# Patient Record
Sex: FEMALE | Race: WHITE | Employment: FULL TIME | URBAN - METROPOLITAN AREA
[De-identification: names, ages, dates, MRNs, and addresses within clinical notes are randomized per-mention and may not be internally consistent; named-entity substitution may affect disease eponyms.]

---

## 2017-08-14 PROBLEM — E66.9 OBESITY: Status: ACTIVE | Noted: 2017-08-14

## 2017-08-14 PROBLEM — K21.9 GASTROESOPHAGEAL REFLUX DISEASE: Status: ACTIVE | Noted: 2017-08-14

## 2017-11-04 ENCOUNTER — HOSPITAL ENCOUNTER (OUTPATIENT)
Dept: GENERAL RADIOLOGY | Age: 27
Discharge: HOME | End: 2017-11-04

## 2017-11-04 ENCOUNTER — HOSPITAL ENCOUNTER (OUTPATIENT)
Age: 27
Discharge: HOME | End: 2017-11-04

## 2017-11-04 DIAGNOSIS — Z00.00 PHYSICAL EXAM: ICD-10-CM

## 2017-11-04 LAB
ABSOLUTE EOS #: 0.09 K/UL (ref 0–0.44)
ABSOLUTE IMMATURE GRANULOCYTE: 0.03 K/UL (ref 0–0.3)
ABSOLUTE LYMPH #: 1.94 K/UL (ref 1.1–3.7)
ABSOLUTE MONO #: 0.68 K/UL (ref 0.1–1.2)
ALBUMIN SERPL-MCNC: 4.3 G/DL (ref 3.5–5.2)
ALBUMIN/GLOBULIN RATIO: 1.3 (ref 1–2.5)
ALP BLD-CCNC: 81 U/L (ref 35–104)
ALT SERPL-CCNC: 15 U/L (ref 5–33)
ANION GAP SERPL CALCULATED.3IONS-SCNC: 14 MMOL/L (ref 9–17)
AST SERPL-CCNC: 17 U/L
BASOPHILS # BLD: 0 % (ref 0–2)
BASOPHILS ABSOLUTE: 0.03 K/UL (ref 0–0.2)
BILIRUB SERPL-MCNC: 0.44 MG/DL (ref 0.3–1.2)
BUN BLDV-MCNC: 16 MG/DL (ref 6–20)
BUN/CREAT BLD: NORMAL (ref 9–20)
CALCIUM SERPL-MCNC: 9.1 MG/DL (ref 8.6–10.4)
CHLORIDE BLD-SCNC: 102 MMOL/L (ref 98–107)
CHOLESTEROL/HDL RATIO: 2.9
CHOLESTEROL: 169 MG/DL
CO2: 20 MMOL/L (ref 20–31)
CREAT SERPL-MCNC: 0.5 MG/DL (ref 0.5–0.9)
DIFFERENTIAL TYPE: NORMAL
EOSINOPHILS RELATIVE PERCENT: 1 % (ref 1–4)
GFR AFRICAN AMERICAN: >60 ML/MIN
GFR NON-AFRICAN AMERICAN: >60 ML/MIN
GFR SERPL CREATININE-BSD FRML MDRD: NORMAL ML/MIN/{1.73_M2}
GFR SERPL CREATININE-BSD FRML MDRD: NORMAL ML/MIN/{1.73_M2}
GLOBULIN: NORMAL G/DL (ref 1.5–3.8)
GLUCOSE BLD-MCNC: 98 MG/DL (ref 70–99)
HBV SURFACE AB TITR SER: 806.2 MIU/ML
HCG QUALITATIVE: NEGATIVE
HCT VFR BLD CALC: 44.8 % (ref 36.3–47.1)
HDLC SERPL-MCNC: 58 MG/DL
HEMOGLOBIN: 14.7 G/DL (ref 11.9–15.1)
IMMATURE GRANULOCYTES: 0 %
IRON: 81 UG/DL (ref 37–145)
LACTATE DEHYDROGENASE: 167 U/L (ref 135–214)
LDL CHOLESTEROL: 102 MG/DL (ref 0–130)
LYMPHOCYTES # BLD: 25 % (ref 24–43)
MCH RBC QN AUTO: 30.4 PG (ref 25.2–33.5)
MCHC RBC AUTO-ENTMCNC: 32.8 G/DL (ref 28.4–34.8)
MCV RBC AUTO: 92.6 FL (ref 82.6–102.9)
MONOCYTES # BLD: 9 % (ref 3–12)
PDW BLD-RTO: 12.8 % (ref 11.8–14.4)
PHOSPHORUS: 3 MG/DL (ref 2.6–4.5)
PLATELET # BLD: 316 K/UL (ref 138–453)
PLATELET ESTIMATE: NORMAL
PMV BLD AUTO: 9.4 FL (ref 8.1–13.5)
POTASSIUM SERPL-SCNC: 4.5 MMOL/L (ref 3.7–5.3)
RBC # BLD: 4.84 M/UL (ref 3.95–5.11)
RBC # BLD: NORMAL 10*6/UL
SEG NEUTROPHILS: 65 % (ref 36–65)
SEGMENTED NEUTROPHILS ABSOLUTE COUNT: 5.1 K/UL (ref 1.5–8.1)
SODIUM BLD-SCNC: 136 MMOL/L (ref 135–144)
TOTAL PROTEIN: 7.6 G/DL (ref 6.4–8.3)
TRIGL SERPL-MCNC: 43 MG/DL
URIC ACID: 4.5 MG/DL (ref 2.4–5.7)
VLDLC SERPL CALC-MCNC: NORMAL MG/DL (ref 1–30)
WBC # BLD: 7.9 K/UL (ref 3.5–11.3)
WBC # BLD: NORMAL 10*3/UL

## 2017-11-04 PROCEDURE — 83540 ASSAY OF IRON: CPT

## 2017-11-04 PROCEDURE — 85025 COMPLETE CBC W/AUTO DIFF WBC: CPT

## 2017-11-04 PROCEDURE — 84703 CHORIONIC GONADOTROPIN ASSAY: CPT

## 2017-11-04 PROCEDURE — 80053 COMPREHEN METABOLIC PANEL: CPT

## 2017-11-04 PROCEDURE — 84100 ASSAY OF PHOSPHORUS: CPT

## 2017-11-04 PROCEDURE — 36415 COLL VENOUS BLD VENIPUNCTURE: CPT

## 2017-11-04 PROCEDURE — 86317 IMMUNOASSAY INFECTIOUS AGENT: CPT

## 2017-11-04 PROCEDURE — 84550 ASSAY OF BLOOD/URIC ACID: CPT

## 2017-11-04 PROCEDURE — 80061 LIPID PANEL: CPT

## 2017-11-04 PROCEDURE — 86480 TB TEST CELL IMMUN MEASURE: CPT

## 2017-11-04 PROCEDURE — 83615 LACTATE (LD) (LDH) ENZYME: CPT

## 2017-11-04 PROCEDURE — 71010 XR CHEST LIMITED: CPT

## 2017-11-06 LAB
QUANTIFERON (R) TB GOLD (INCUBATED): NEGATIVE
QUANTIFERON MITOGEN: >10 IU/ML
QUANTIFERON NIL: 0.03 IU/ML
QUANTIFERON TB AG MINUS NIL: 0 IU/ML (ref 0–0.34)

## 2018-07-20 ENCOUNTER — HOSPITAL ENCOUNTER (EMERGENCY)
Facility: CLINIC | Age: 28
Discharge: HOME | End: 2018-07-20
Attending: EMERGENCY MEDICINE
Payer: COMMERCIAL

## 2018-07-20 VITALS
DIASTOLIC BLOOD PRESSURE: 84 MMHG | BODY MASS INDEX: 30.61 KG/M2 | SYSTOLIC BLOOD PRESSURE: 132 MMHG | OXYGEN SATURATION: 100 % | WEIGHT: 195 LBS | HEART RATE: 58 BPM | HEIGHT: 67 IN | TEMPERATURE: 97.5 F | RESPIRATION RATE: 12 BRPM

## 2018-07-20 DIAGNOSIS — B35.4 TINEA CORPORIS: Primary | ICD-10-CM

## 2018-07-20 PROCEDURE — 99282 EMERGENCY DEPT VISIT SF MDM: CPT

## 2018-07-20 RX ORDER — NYSTATIN 100000 U/G
CREAM TOPICAL
Qty: 15 G | Refills: 0 | Status: SHIPPED | OUTPATIENT
Start: 2018-07-20 | End: 2018-10-08

## 2018-07-20 ASSESSMENT — PAIN SCALES - GENERAL
PAINLEVEL_OUTOF10: 7
PAINLEVEL_OUTOF10: 7

## 2018-07-20 ASSESSMENT — PAIN DESCRIPTION - DESCRIPTORS
DESCRIPTORS: BURNING;ITCHING
DESCRIPTORS: BURNING;ITCHING

## 2018-07-20 ASSESSMENT — PAIN DESCRIPTION - LOCATION
LOCATION: UMBILICUS
LOCATION: UMBILICUS

## 2018-07-20 ASSESSMENT — PAIN DESCRIPTION - PAIN TYPE
TYPE: ACUTE PAIN
TYPE: ACUTE PAIN

## 2018-07-20 ASSESSMENT — PAIN DESCRIPTION - FREQUENCY: FREQUENCY: CONTINUOUS

## 2018-07-20 NOTE — ED NOTES
Patient arrives to the ED for complaints of a rash. States it started several days ago, redness tot he R side of her umbilicus. It has since spread around and in her entire umbilical area. It is burning and itching. She has tried Hydrocortisone cream and Vagisil. Also has been using an ice pack for comfort. Patient given an ice pack, states she has been taking Tylenol/ Motrin as well. Resting quietly, call light in reach.       Tomeka Penn RN  07/20/18 [id number]

## 2018-07-20 NOTE — ED PROVIDER NOTES
TOPIRAMATE (TOPAMAX) 50 MG TABLET    TAKE ONE TABLET BY MOUTH TWICE A DAY       ALLERGIES     is allergic to amoxicillin; other; pcn [penicillins]; and sulfa antibiotics. FAMILY HISTORY     indicated that the status of her father is unknown. She indicated that the status of her maternal grandmother is unknown. She indicated that the status of her maternal grandfather is unknown. She indicated that the status of her paternal grandmother is unknown. She indicated that the status of her paternal grandfather is unknown.      family history includes Cancer in her paternal grandfather and paternal grandmother; Diabetes in her maternal grandfather; Heart Disease in her father, maternal grandfather, maternal grandmother, paternal grandfather, and paternal grandmother. SOCIAL HISTORY      reports that she has never smoked. She has never used smokeless tobacco. She reports that she does not drink alcohol or use drugs. PHYSICAL EXAM     INITIAL VITALS:  height is 5' 7\" (1.702 m) and weight is 88.5 kg (195 lb). Her oral temperature is 97.5 °F (36.4 °C). Her blood pressure is 132/84 and her pulse is 58. Her respiration is 12 and oxygen saturation is 100%. Patient is alert and oriented, in no apparent distress. HEENT is atraumatic. Pupils are PERRL at 4 mm. Mucous membranes moist.    Neck is supple with no lymphadenopathy. No JVD. No meningismus. Heart sounds regular rate and rhythm with no gallops, murmurs, or rubs. Lungs clear, no wheezes, rales or rhonchi. Abdomen: soft, nontender with no pain to palpation. Normal bowel sounds are noted. No rebound or guarding. Redness noted in periumbilical area and within the umbilicus consistent with a candidal infection. No true cellulitic process or abscess. Musculoskeletal exam shows no evidence of trauma. Skin: no rash or edema. Neurological exam reveals cranial nerves 2 through 12 grossly intact.   Patient has equal  and normal deep tendon reflexes. Psychiatric: no hallucinations or suicidal ideation. Lymphatics.:  No lymphadenopathy. DIFFERENTIAL DIAGNOSIS/ MDM:     Candidiasis, cellulitis, abscess    DIAGNOSTIC RESULTS       EMERGENCY DEPARTMENT COURSE:   Vitals:    Vitals:    07/20/18 1828   BP: 132/84   Pulse: 58   Resp: 12   Temp: 97.5 °F (36.4 °C)   TempSrc: Oral   SpO2: 100%   Weight: 88.5 kg (195 lb)   Height: 5' 7\" (1.702 m)     -------------------------  BP: 132/84, Temp: 97.5 °F (36.4 °C), Pulse: 58, Resp: 12      Re-evaluation Notes    I will prescribe antifungal medication. The patient declines work excuse. She is discharged in good condition. FINAL IMPRESSION      1. Tinea corporis          DISPOSITION/PLAN   DISPOSITION Decision To Discharge 07/20/2018 06:47:05 PM      Condition on Disposition    good    PATIENT REFERRED TO:  Hamilton Muller MD  [address] [facility] [id number] [phone]    In 1 week        DISCHARGE MEDICATIONS:  New Prescriptions    NYSTATIN (MYCOSTATIN) 617966 UNIT/GM CREAM    Apply topically 2 times daily.        (Please note that portions of this note were completed with a voice recognition program.  Efforts were made to edit the dictations but occasionally words are mis-transcribed.)    Angel MD   Attending Emergency Physician       Carolynn Ascencio MD  07/20/18 2188

## 2018-10-30 ENCOUNTER — HOSPITAL ENCOUNTER (OUTPATIENT)
Dept: MRI IMAGING | Age: 28
Discharge: HOME | End: 2018-11-01
Payer: COMMERCIAL

## 2018-10-30 ENCOUNTER — HOSPITAL ENCOUNTER (OUTPATIENT)
Age: 28
Discharge: HOME | End: 2018-11-01
Payer: COMMERCIAL

## 2018-10-30 ENCOUNTER — HOSPITAL ENCOUNTER (OUTPATIENT)
Dept: GENERAL RADIOLOGY | Age: 28
Discharge: HOME | End: 2018-11-01
Payer: COMMERCIAL

## 2018-10-30 DIAGNOSIS — R52 PAIN: ICD-10-CM

## 2018-10-30 DIAGNOSIS — G89.29 CHRONIC NONINTRACTABLE HEADACHE, UNSPECIFIED HEADACHE TYPE: ICD-10-CM

## 2018-10-30 DIAGNOSIS — R51.9 CHRONIC NONINTRACTABLE HEADACHE, UNSPECIFIED HEADACHE TYPE: ICD-10-CM

## 2018-10-30 PROCEDURE — 70551 MRI BRAIN STEM W/O DYE: CPT

## 2018-10-30 PROCEDURE — 72040 X-RAY EXAM NECK SPINE 2-3 VW: CPT

## 2018-12-31 RX ORDER — TOPIRAMATE 50 MG/1
100 TABLET, FILM COATED ORAL 2 TIMES DAILY
Qty: 120 TABLET | Refills: 2 | Status: SHIPPED | OUTPATIENT
Start: 2018-12-31 | End: 2019-05-22

## 2019-03-22 ENCOUNTER — OFFICE VISIT (OUTPATIENT)
Dept: PRIMARY CARE CLINIC | Age: 29
End: 2019-03-22
Payer: COMMERCIAL

## 2019-03-22 ENCOUNTER — HOSPITAL ENCOUNTER (OUTPATIENT)
Age: 29
Setting detail: SPECIMEN
Discharge: HOME | End: 2019-03-22
Payer: COMMERCIAL

## 2019-03-22 VITALS
BODY MASS INDEX: 36.9 KG/M2 | OXYGEN SATURATION: 98 % | WEIGHT: 235.6 LBS | SYSTOLIC BLOOD PRESSURE: 134 MMHG | DIASTOLIC BLOOD PRESSURE: 84 MMHG | TEMPERATURE: 98.2 F | HEART RATE: 83 BPM

## 2019-03-22 DIAGNOSIS — Z11.4 ENCOUNTER FOR SCREENING FOR HIV: ICD-10-CM

## 2019-03-22 DIAGNOSIS — R73.01 IMPAIRED FASTING GLUCOSE: Primary | ICD-10-CM

## 2019-03-22 DIAGNOSIS — J06.9 VIRAL UPPER RESPIRATORY TRACT INFECTION: ICD-10-CM

## 2019-03-22 DIAGNOSIS — E66.9 OBESITY (BMI 35.0-39.9 WITHOUT COMORBIDITY): ICD-10-CM

## 2019-03-22 DIAGNOSIS — R73.01 IMPAIRED FASTING GLUCOSE: ICD-10-CM

## 2019-03-22 LAB — HIV AG/AB: NONREACTIVE

## 2019-03-22 PROCEDURE — 99214 OFFICE O/P EST MOD 30 MIN: CPT | Performed by: PHYSICIAN ASSISTANT

## 2019-03-22 ASSESSMENT — PATIENT HEALTH QUESTIONNAIRE - PHQ9
SUM OF ALL RESPONSES TO PHQ QUESTIONS 1-9: 0
SUM OF ALL RESPONSES TO PHQ9 QUESTIONS 1 & 2: 0
2. FEELING DOWN, DEPRESSED OR HOPELESS: 0
1. LITTLE INTEREST OR PLEASURE IN DOING THINGS: 0
SUM OF ALL RESPONSES TO PHQ QUESTIONS 1-9: 0

## 2019-03-22 ASSESSMENT — ENCOUNTER SYMPTOMS
SINUS PRESSURE: 1
SINUS PAIN: 1
SORE THROAT: 0
COUGH: 0

## 2019-03-24 LAB
ESTIMATED AVERAGE GLUCOSE: 100 MG/DL
HBA1C MFR BLD: 5.1 % (ref 4–6)

## 2019-05-02 DIAGNOSIS — E66.9 OBESITY (BMI 35.0-39.9 WITHOUT COMORBIDITY): ICD-10-CM

## 2019-05-02 NOTE — TELEPHONE ENCOUNTER
Last visit: 3/22/19  Last Med refill: 3/22/19  Does patient have enough medication for 72 hours: Yes      Next Visit Date:  Future Appointments   Date Time Provider Dov Thomason   5/22/2019  9:15 AM Billy Diaz PA-C [facility] [address] Maintenance   Topic Date Due    Varicella Vaccine (1 of 2 - 13+ 2-dose series) 10/23/2009    Cervical cancer screen  03/28/2019    A1C test (Diabetic or Prediabetic)  03/22/2020    DTaP/Tdap/Td vaccine (6 - Td) 01/13/2021    Flu vaccine  Completed    HIV screen  Completed    Pneumococcal 0-64 years Vaccine  Aged Out       Hemoglobin A1C (%)   Date Value   03/22/2019 5.1             ( goal A1C is < 7)   No results found for: LABMICR  LDL Cholesterol (mg/dL)   Date Value   11/04/2017 102       (goal LDL is <100)   AST (U/L)   Date Value   11/04/2017 17     ALT (U/L)   Date Value   11/04/2017 15     BUN (mg/dL)   Date Value   11/04/2017 16     BP Readings from Last 3 Encounters:   03/22/19 134/84   10/08/18 122/74   09/06/18 124/88          (goal 120/80)    All Future Testing planned in [facility]  Lab Frequency Next Occurrence               Patient Active Problem List:     Attention deficit disorder without hyperactivity     Cervicalgia     Gastroenteritis due to Clostridium difficile     Depression with anxiety     Encounter for pre-employment health screening examination     Examination for school or camp     Exercise-induced bronchospasm     Factor V deficiency (HCC)     Headache     Heartburn     Impaired fasting glucose     Irritable bowel syndrome     Methicillin resistant Staphylococcus aureus infection     Xerosis cutis     Gastroesophageal reflux disease

## 2019-05-22 ENCOUNTER — OFFICE VISIT (OUTPATIENT)
Dept: PRIMARY CARE CLINIC | Age: 29
End: 2019-05-22
Payer: COMMERCIAL

## 2019-05-22 VITALS
DIASTOLIC BLOOD PRESSURE: 86 MMHG | OXYGEN SATURATION: 98 % | SYSTOLIC BLOOD PRESSURE: 122 MMHG | HEART RATE: 89 BPM | WEIGHT: 227.4 LBS | BODY MASS INDEX: 35.62 KG/M2

## 2019-05-22 DIAGNOSIS — L91.8 SKIN TAG: ICD-10-CM

## 2019-05-22 DIAGNOSIS — E66.9 OBESITY (BMI 35.0-39.9 WITHOUT COMORBIDITY): ICD-10-CM

## 2019-05-22 PROCEDURE — 99213 OFFICE O/P EST LOW 20 MIN: CPT | Performed by: PHYSICIAN ASSISTANT

## 2019-05-22 ASSESSMENT — ENCOUNTER SYMPTOMS: BACK PAIN: 0

## 2019-05-22 NOTE — PROGRESS NOTES
[address] [facility] PRIMARY CARE  [id number] [facility]  Dept: Milana is a 34 y.o. female who presents today for her medical conditions/complaintsas noted below. Chief Complaint   Patient presents with    Weight Management     Taking Contrave. Lost 8 lbs since last visit. Going well, no SE.        HPI:     HPI  Exercise  4-5 times a week.     Contrave: no side effects  Personal goal: 180  Diet: cutting out carbs, portion control    Has small skin tag on right nipple--no pain and does not bother her at this time      LDL Cholesterol (mg/dL)   Date Value   11/04/2017 102       (goal LDL is <100)   AST (U/L)   Date Value   11/04/2017 17     ALT (U/L)   Date Value   11/04/2017 15     BUN (mg/dL)   Date Value   11/04/2017 16     BP Readings from Last 3 Encounters:   05/22/19 122/86   03/22/19 134/84   10/08/18 122/74          (goal 120/80)    Past Medical History:   Diagnosis Date    Factor V Leiden mutation ([facility] [address]     H/O concussion     H/O contact dermatitis     H/O irritable bowel syndrome     Pain of anterior chest wall with respiration       Past Surgical History:   Procedure Laterality Date    CHOLECYSTECTOMY      PARTIAL HYMENECTOMY  2008       Family History   Problem Relation Age of Onset    Heart Disease Father     Heart Disease Maternal Grandmother     Diabetes Maternal Grandfather     Heart Disease Maternal Grandfather     Cancer Paternal Grandmother         breast    Heart Disease Paternal Grandmother     Heart Disease Paternal Grandfather     Cancer Paternal Grandfather         prostate       Social History     Tobacco Use    Smoking status: Never Smoker    Smokeless tobacco: Never Used   Substance Use Topics    Alcohol use: No     Alcohol/week: 0.0 oz      Current Outpatient Medications   Medication Sig Dispense Refill    topiramate ER (TROKENDI XR) 100 MG CP24 Take 1 tablet by mouth nightly      naltrexone-bupropion (CONTRAVE) 8-90 MG per extended release tablet Take 2 tablets by mouth 2 times daily 120 tablet 2    pantoprazole (PROTONIX) 40 MG tablet Take 1 tablet by mouth daily (Patient taking differently: Take 40 mg by mouth as needed ) 30 tablet 3    albuterol sulfate HFA (VENTOLIN HFA) 108 (90 Base) MCG/ACT inhaler Inhale 2 puffs into the lungs every 6 hours as needed for Wheezing 1 Inhaler 3     No current facility-administered medications for this visit. Allergies   Allergen Reactions    Amoxicillin     Other      Food dye     Blue and green    Pcn [Penicillins]     Sulfa Antibiotics        Health Maintenance   Topic Date Due    Varicella Vaccine (1 of 2 - 13+ 2-dose series) 10/23/2009    Cervical cancer screen  03/28/2019    A1C test (Diabetic or Prediabetic)  03/22/2020    DTaP/Tdap/Td vaccine (6 - Td) 01/13/2021    Flu vaccine  Completed    HIV screen  Completed    Pneumococcal 0-64 years Vaccine  Aged Out       Subjective:      Review of Systems   Constitutional: Negative for activity change, appetite change and unexpected weight change. Endocrine: Negative for cold intolerance, heat intolerance, polydipsia, polyphagia and polyuria. Musculoskeletal: Negative for arthralgias, back pain and myalgias. Psychiatric/Behavioral: Negative for behavioral problems, dysphoric mood and sleep disturbance. The patient is not nervous/anxious. Objective:     /86   Pulse 89   Wt 227 lb 6.4 oz (103.1 kg)   SpO2 98%   BMI 35.62 kg/m²   Physical Exam   Constitutional: She is oriented to person, place, and time. Cardiovascular: Normal rate, regular rhythm and normal heart sounds. Pulmonary/Chest: Effort normal and breath sounds normal.   Neurological: She is alert and oriented to person, place, and time. Skin:   Small skin tag on right nipple---looks red but not painful. Psychiatric: She has a normal mood and affect. Vitals reviewed.       Assessment:

## 2019-07-12 ENCOUNTER — HOSPITAL ENCOUNTER (OUTPATIENT)
Age: 29
Setting detail: SPECIMEN
Discharge: HOME | End: 2019-07-12
Payer: COMMERCIAL

## 2019-07-12 ENCOUNTER — OFFICE VISIT (OUTPATIENT)
Dept: PRIMARY CARE CLINIC | Age: 29
End: 2019-07-12
Payer: COMMERCIAL

## 2019-07-12 VITALS
DIASTOLIC BLOOD PRESSURE: 64 MMHG | WEIGHT: 230.8 LBS | BODY MASS INDEX: 36.15 KG/M2 | HEART RATE: 77 BPM | OXYGEN SATURATION: 98 % | TEMPERATURE: 98.7 F | SYSTOLIC BLOOD PRESSURE: 112 MMHG

## 2019-07-12 DIAGNOSIS — J02.9 ACUTE VIRAL PHARYNGITIS: ICD-10-CM

## 2019-07-12 DIAGNOSIS — R39.9 URINARY TRACT INFECTION SYMPTOMS: Primary | ICD-10-CM

## 2019-07-12 DIAGNOSIS — R39.9 URINARY TRACT INFECTION SYMPTOMS: ICD-10-CM

## 2019-07-12 LAB
BILIRUBIN, POC: ABNORMAL
BLOOD URINE, POC: ABNORMAL
CLARITY, POC: CLEAR
COLOR, POC: YELLOW
GLUCOSE URINE, POC: ABNORMAL
KETONES, POC: ABNORMAL
LEUKOCYTE EST, POC: ABNORMAL
NITRITE, POC: ABNORMAL
PH, POC: 7.5
PROTEIN, POC: ABNORMAL
SPECIFIC GRAVITY, POC: 1.01
UROBILINOGEN, POC: 0.2

## 2019-07-12 PROCEDURE — 81003 URINALYSIS AUTO W/O SCOPE: CPT | Performed by: PHYSICIAN ASSISTANT

## 2019-07-12 PROCEDURE — 99213 OFFICE O/P EST LOW 20 MIN: CPT | Performed by: PHYSICIAN ASSISTANT

## 2019-07-12 ASSESSMENT — ENCOUNTER SYMPTOMS
ABDOMINAL DISTENTION: 0
WHEEZING: 0
SORE THROAT: 1
COUGH: 1
CONSTIPATION: 0
ABDOMINAL PAIN: 0
EYES NEGATIVE: 1
DIARRHEA: 0
VOMITING: 0
BACK PAIN: 0
NAUSEA: 0
RHINORRHEA: 0
SHORTNESS OF BREATH: 0

## 2019-07-12 NOTE — PROGRESS NOTES
Negative for arthralgias, back pain and myalgias. Skin: Negative for rash. Neurological: Negative for dizziness, light-headedness and headaches. Objective:     /64   Pulse 77   Temp 98.7 °F (37.1 °C)   Wt 230 lb 12.8 oz (104.7 kg)   LMP 06/28/2019 (Within Days)   SpO2 98%   BMI 36.15 kg/m²   Physical Exam   HENT:   Right Ear: Tympanic membrane, external ear and ear canal normal. No middle ear effusion. Left Ear: Tympanic membrane, external ear and ear canal normal.  No middle ear effusion. Nose: Nose normal. Right sinus exhibits no maxillary sinus tenderness and no frontal sinus tenderness. Left sinus exhibits no maxillary sinus tenderness and no frontal sinus tenderness. Mouth/Throat: Uvula is midline, oropharynx is clear and moist and mucous membranes are normal. Tonsils are 0 on the right. Tonsils are 0 on the left. Eyes: Pupils are equal, round, and reactive to light. Conjunctivae are normal. Right eye exhibits no discharge. Left eye exhibits no discharge. Cardiovascular: Normal rate, regular rhythm and normal heart sounds. Pulmonary/Chest: Effort normal and breath sounds normal.   Abdominal: Normal appearance and bowel sounds are normal. There is no hepatosplenomegaly. There is no tenderness. There is no rigidity, no rebound, no guarding, no CVA tenderness, no tenderness at McBurney's point and negative Devi's sign. Vitals reviewed. Assessment:       Diagnosis Orders   1. Urinary tract infection symptoms  Urine Culture    POCT Urinalysis No Micro (Auto)   2. Acute viral pharyngitis          Plan:    Supportive care  Did send urine for culture. Increase fluids   No follow-ups on file. Orders Placed This Encounter   Procedures    Urine Culture     Standing Status:   Future     Standing Expiration Date:   7/12/2020     Order Specific Question:   Specify (ex-cath, midstream, cysto, etc)?      Answer:   midstream    POCT Urinalysis No Micro (Auto)     No orders of the

## 2019-07-13 LAB
CULTURE: NORMAL
Lab: NORMAL
SPECIMEN DESCRIPTION: NORMAL

## 2019-08-20 ENCOUNTER — TELEPHONE (OUTPATIENT)
Dept: PRIMARY CARE CLINIC | Age: 29
End: 2019-08-20

## 2019-08-20 ENCOUNTER — OFFICE VISIT (OUTPATIENT)
Dept: PRIMARY CARE CLINIC | Age: 29
End: 2019-08-20
Payer: COMMERCIAL

## 2019-08-20 VITALS
HEIGHT: 67 IN | OXYGEN SATURATION: 98 % | WEIGHT: 234.8 LBS | HEART RATE: 74 BPM | BODY MASS INDEX: 36.85 KG/M2 | SYSTOLIC BLOOD PRESSURE: 108 MMHG | DIASTOLIC BLOOD PRESSURE: 70 MMHG

## 2019-08-20 DIAGNOSIS — G47.19 EXCESSIVE DAYTIME SLEEPINESS: ICD-10-CM

## 2019-08-20 DIAGNOSIS — E66.9 OBESITY (BMI 35.0-39.9 WITHOUT COMORBIDITY): ICD-10-CM

## 2019-08-20 DIAGNOSIS — E66.9 OBESITY (BMI 35.0-39.9 WITHOUT COMORBIDITY): Primary | ICD-10-CM

## 2019-08-20 DIAGNOSIS — G47.09 OTHER INSOMNIA: ICD-10-CM

## 2019-08-20 PROCEDURE — 99213 OFFICE O/P EST LOW 20 MIN: CPT | Performed by: PHYSICIAN ASSISTANT

## 2019-08-20 RX ORDER — ESZOPICLONE 3 MG/1
3 TABLET, FILM COATED ORAL NIGHTLY PRN
Qty: 30 TABLET | Refills: 0 | Status: SHIPPED | OUTPATIENT
Start: 2019-08-20 | End: 2019-09-19

## 2019-08-20 ASSESSMENT — ENCOUNTER SYMPTOMS
RESPIRATORY NEGATIVE: 1
BACK PAIN: 0

## 2019-08-20 NOTE — PROGRESS NOTES
dysphoric mood. The patient is not nervous/anxious. Objective:     /70   Pulse 74   Ht 5' 6.5\" (1.689 m)   Wt 234 lb 12.8 oz (106.5 kg)   SpO2 98%   BMI 37.33 kg/m²   Physical Exam   Cardiovascular: Normal rate, regular rhythm and normal heart sounds. Pulmonary/Chest: Effort normal and breath sounds normal.   Vitals reviewed. Assessment:       Diagnosis Orders   1. Obesity (BMI 35.0-39.9 without comorbidity)  Lorcaserin HCl (BELVIQ) 10 MG TABS   2. Other insomnia  eszopiclone (ESZOPICLONE) 3 MG TABS    Baseline Diagnostic Sleep Study   3. Excessive daytime sleepiness  Baseline Diagnostic Sleep Study        Plan:    [place] scale today ---sleep study ordered  Trial Lunesta for a 5 day reset when off and then PRN  Belviq script given  Count calories   Exercise daily    Return in about 2 months (around 10/20/2019). Orders Placed This Encounter   Procedures    Baseline Diagnostic Sleep Study     Standing Status:   Future     Standing Expiration Date:   2/20/2021     Order Specific Question:   Adult or Pediatric     Answer:   Adult Study (>7 Years)     Order Specific Question:   Location For Sleep Study     Answer: [facility] Specific Question:   Select Sleep Lab Location     Answer:   [address]     Order Specific Question:   Pre-Study Patient Questions: Answer:   Complains of daytime sleepiness     Orders Placed This Encounter   Medications    Lorcaserin HCl (BELVIQ) 10 MG TABS     Sig: Take 10 mg by mouth 2 times daily for 30 days. 15 day free trial     Dispense:  30 tablet     Refill:  1    eszopiclone (ESZOPICLONE) 3 MG TABS     Sig: Take 1 tablet by mouth nightly as needed (insomnia) for up to 30 days. Dispense:  30 tablet     Refill:  0       Patient given educationalmaterials - see patient instructions. Discussed use, benefit, and side effectsof prescribed medications. All patient questions answered. Pt voiced understanding. Reviewed health maintenance. Instructed to continue current medications, diet andexercise. Patient agreed with treatment plan. Follow up as directed.      Electronicallysigned by Carole Harp PA-C on 8/20/2019 at 9:56 AM

## 2020-01-14 ENCOUNTER — OFFICE VISIT (OUTPATIENT)
Dept: PRIMARY CARE CLINIC | Age: 30
End: 2020-01-14
Payer: COMMERCIAL

## 2020-01-14 VITALS
SYSTOLIC BLOOD PRESSURE: 120 MMHG | HEIGHT: 66 IN | WEIGHT: 247 LBS | TEMPERATURE: 97.9 F | HEART RATE: 79 BPM | BODY MASS INDEX: 39.7 KG/M2 | OXYGEN SATURATION: 97 % | DIASTOLIC BLOOD PRESSURE: 82 MMHG

## 2020-01-14 PROBLEM — R05.9 COUGH: Status: ACTIVE | Noted: 2020-01-14

## 2020-01-14 PROCEDURE — 99213 OFFICE O/P EST LOW 20 MIN: CPT | Performed by: PHYSICIAN ASSISTANT

## 2020-01-14 RX ORDER — BENZONATATE 100 MG/1
100-200 CAPSULE ORAL 3 TIMES DAILY PRN
Qty: 60 CAPSULE | Refills: 0 | Status: SHIPPED | OUTPATIENT
Start: 2020-01-14 | End: 2020-01-21

## 2020-01-14 RX ORDER — BENZONATATE 100 MG/1
100-200 CAPSULE ORAL 3 TIMES DAILY PRN
Qty: 60 CAPSULE | Refills: 0 | Status: SHIPPED | OUTPATIENT
Start: 2020-01-14 | End: 2020-01-14 | Stop reason: SDUPTHER

## 2020-01-14 ASSESSMENT — ENCOUNTER SYMPTOMS
SORE THROAT: 1
SHORTNESS OF BREATH: 0
CHOKING: 0
WHEEZING: 0
VOMITING: 0
CHEST TIGHTNESS: 0
SINUS PAIN: 1
TROUBLE SWALLOWING: 1
DIARRHEA: 0
NAUSEA: 0
COUGH: 1

## 2020-01-14 NOTE — PROGRESS NOTES
[facility][1] Primary Care  [address]  Phone: [phone]  Fax: [phone]    Ninoska Sosa is a 34 y.o. female who presents today for her medical conditions/complaintsas noted below. Chief Complaint   Patient presents with    Pharyngitis     patient c/o sore throat since 1/5/20. Patient said it hurts to swollow    Cough     patinet c/o of dry cough since 1/5/20. HPI:     HPI  Started 1/5/20 with ST and now feels like something is stuck in throat. Hurts more at home  Ibuprofen and Mucinex. Current Outpatient Medications   Medication Sig Dispense Refill    benzonatate (TESSALON) 100 MG capsule Take 1-2 capsules by mouth 3 times daily as needed for Cough 60 capsule 0    topiramate ER (TROKENDI XR) 100 MG CP24 Take 1 tablet by mouth nightly      pantoprazole (PROTONIX) 40 MG tablet Take 1 tablet by mouth daily (Patient taking differently: Take 40 mg by mouth as needed ) 30 tablet 3     No current facility-administered medications for this visit. Allergies   Allergen Reactions    Amoxicillin     Other      Food dye     Blue and green    Pcn [Penicillins]     Sulfa Antibiotics        Subjective:      Review of Systems   Constitutional: Positive for fatigue. Negative for chills, diaphoresis and fever. HENT: Positive for congestion, sinus pain (left above eye), sore throat and trouble swallowing. Respiratory: Positive for cough. Negative for choking, chest tightness, shortness of breath and wheezing. Gastrointestinal: Negative for diarrhea, nausea and vomiting. Neurological: Negative for dizziness, light-headedness and headaches. Objective:     /82   Pulse 79   Temp 97.9 °F (36.6 °C)   Ht 5' 6\" (1.676 m)   Wt 247 lb (112 kg)   SpO2 97%   BMI 39.87 kg/m²   Physical Exam  Vitals signs and nursing note reviewed. Constitutional:       Appearance: Normal appearance.    HENT:      Right Ear: Tympanic membrane, ear canal and external ear normal.      Left Ear: Tympanic membrane, ear canal and external ear normal.      Nose: Congestion present. Right Sinus: No maxillary sinus tenderness or frontal sinus tenderness. Left Sinus: No maxillary sinus tenderness or frontal sinus tenderness. Mouth/Throat:      Lips: Pink. Mouth: Mucous membranes are moist.      Pharynx: Posterior oropharyngeal erythema present. Tonsils: No tonsillar exudate or tonsillar abscesses. Swellin on the right. 0 on the left. Eyes:      General: Lids are normal.      Conjunctiva/sclera: Conjunctivae normal.   Neck:      Musculoskeletal: Normal range of motion. Cardiovascular:      Rate and Rhythm: Normal rate and regular rhythm. Heart sounds: Normal heart sounds. Pulmonary:      Effort: Pulmonary effort is normal.      Breath sounds: Normal breath sounds. No wheezing, rhonchi or rales. Lymphadenopathy:      Cervical: No cervical adenopathy. Skin:     Comments: Very dry and pink on cheeks and getting dry cracks around fingers and on hands   Neurological:      General: No focal deficit present. Mental Status: She is alert and oriented to person, place, and time. Psychiatric:         Mood and Affect: Mood normal.         Assessment:       Diagnosis Orders   1. Viral pharyngitis     2. Cough     3. Xerosis of skin          Plan:    Claritin D  Salt water gargles   Cough pill given  Xeracalm samples for hands  Use a moisturizer on face     Return if symptoms worsen or fail to improve. No orders of the defined types were placed in this encounter.     Orders Placed This Encounter   Medications    benzonatate (TESSALON) 100 MG capsule     Sig: Take 1-2 capsules by mouth 3 times daily as needed for Cough     Dispense:  60 capsule     Refill:  0           Electronically signed by Steffen Pitts 2020 at 9:27 AM

## 2020-02-13 PROBLEM — R05.9 COUGH: Status: RESOLVED | Noted: 2020-01-14 | Resolved: 2020-02-13

## 2020-02-26 ENCOUNTER — OFFICE VISIT (OUTPATIENT)
Dept: PRIMARY CARE CLINIC | Age: 30
End: 2020-02-26
Payer: COMMERCIAL

## 2020-02-26 VITALS
HEIGHT: 67 IN | OXYGEN SATURATION: 98 % | BODY MASS INDEX: 38.45 KG/M2 | SYSTOLIC BLOOD PRESSURE: 112 MMHG | DIASTOLIC BLOOD PRESSURE: 76 MMHG | WEIGHT: 245 LBS | HEART RATE: 95 BPM

## 2020-02-26 LAB
BILIRUBIN, POC: ABNORMAL
BLOOD URINE, POC: ABNORMAL
CLARITY, POC: ABNORMAL
COLOR, POC: YELLOW
GLUCOSE URINE, POC: ABNORMAL
KETONES, POC: ABNORMAL
LEUKOCYTE EST, POC: ABNORMAL
NITRITE, POC: ABNORMAL
PH, POC: 5
PROTEIN, POC: ABNORMAL
SPECIFIC GRAVITY, POC: >=1.03
UROBILINOGEN, POC: 0.2

## 2020-02-26 PROCEDURE — 81003 URINALYSIS AUTO W/O SCOPE: CPT | Performed by: NURSE PRACTITIONER

## 2020-02-26 PROCEDURE — 99395 PREV VISIT EST AGE 18-39: CPT | Performed by: NURSE PRACTITIONER

## 2020-02-26 RX ORDER — DOXYCYCLINE 100 MG/1
100 CAPSULE ORAL 2 TIMES DAILY
Qty: 14 CAPSULE | Refills: 0 | Status: SHIPPED | OUTPATIENT
Start: 2020-02-26 | End: 2020-03-04

## 2020-02-26 RX ORDER — BROMPHENIRAMINE MALEATE, PSEUDOEPHEDRINE HYDROCHLORIDE, AND DEXTROMETHORPHAN HYDROBROMIDE 2; 30; 10 MG/5ML; MG/5ML; MG/5ML
10 SYRUP ORAL 4 TIMES DAILY PRN
Qty: 280 ML | Refills: 0 | Status: SHIPPED | OUTPATIENT
Start: 2020-02-26 | End: 2020-03-04

## 2020-02-26 RX ORDER — ESOMEPRAZOLE MAGNESIUM 20 MG/1
20 FOR SUSPENSION ORAL DAILY
COMMUNITY

## 2020-02-26 RX ORDER — CYCLOBENZAPRINE HCL 5 MG
TABLET ORAL
COMMUNITY
Start: 2020-02-11 | End: 2021-12-30

## 2020-02-26 RX ORDER — BENZONATATE 100 MG/1
CAPSULE ORAL
COMMUNITY
Start: 2020-01-14 | End: 2020-02-26 | Stop reason: ALTCHOICE

## 2020-02-26 ASSESSMENT — ENCOUNTER SYMPTOMS
EYE REDNESS: 0
VOMITING: 0
DIARRHEA: 0
BLOOD IN STOOL: 0
EYE DISCHARGE: 0
NAUSEA: 0
BACK PAIN: 0
SORE THROAT: 0
ABDOMINAL PAIN: 0
SINUS PAIN: 1
SINUS PRESSURE: 1
ABDOMINAL DISTENTION: 0
TROUBLE SWALLOWING: 0
WHEEZING: 0
SHORTNESS OF BREATH: 1
CHEST TIGHTNESS: 0
EYE ITCHING: 0
CONSTIPATION: 0
COUGH: 1

## 2020-02-26 NOTE — PROGRESS NOTES
Mouth/Throat:      Lips: Pink. Mouth: Mucous membranes are moist.      Pharynx: Oropharynx is clear. Uvula midline. Eyes:      Extraocular Movements: Extraocular movements intact. Conjunctiva/sclera: Conjunctivae normal.      Pupils: Pupils are equal, round, and reactive to light. Neck:      Musculoskeletal: Normal range of motion and neck supple. Cardiovascular:      Rate and Rhythm: Normal rate and regular rhythm. Heart sounds: Normal heart sounds. Pulmonary:      Effort: Pulmonary effort is normal.      Breath sounds: No stridor. Rhonchi present. No decreased breath sounds, wheezing or rales. Abdominal:      General: Abdomen is flat. Bowel sounds are normal.      Palpations: Abdomen is soft. Musculoskeletal: Normal range of motion. Skin:     General: Skin is warm and dry. Capillary Refill: Capillary refill takes less than 2 seconds. Neurological:      General: No focal deficit present. Mental Status: She is alert and oriented to person, place, and time. Mental status is at baseline. Cranial Nerves: Cranial nerves are intact. Sensory: Sensation is intact. Motor: Motor function is intact. Coordination: Coordination is intact. Gait: Gait is intact. Deep Tendon Reflexes: Reflexes are normal and symmetric. Psychiatric:         Attention and Perception: Attention and perception normal.         Mood and Affect: Mood and affect normal.         Speech: Speech normal.         Behavior: Behavior normal. Behavior is cooperative. Thought Content: Thought content normal.         Cognition and Memory: Cognition and memory normal.         Judgment: Judgment normal.         Assessment:      Diagnosis Orders   1. Annual physical exam     2. Antibody response exam  Varicella Zoster Antibody, IgG    Rubeola Antibody IgG    Mumps Antibody, IgG    Rubella Antibody, IgG    Hepatitis B Surface Antibody   3.  Healthcare maintenance  POCT Urinalysis No Micro (Auto)    CBC Auto Differential    Comprehensive Metabolic Panel    Lipid Panel   4. Rhinosinusitis  doxycycline monohydrate (MONODOX) 100 MG capsule   5. Respiratory tract congestion with cough  brompheniramine-pseudoephedrine-DM 2-30-10 MG/5ML syrup         Plan:    1. Please complete blood work fasting. The titers were ordered because her levels in 2017 were borderline low. An immunization record was provided to the patient to save for her records. 2. She needs to clarify with school if a PPD is required. 3. Take prescriptions as written. Drink plenty of water. 4. Call office right away if you experience belly button pain, drainage or redness. 5. ER or 911 if you experience chest pains or difficulty breathing. 6. Follow up with neurology regarding cervical migraines and neck pains. 7. Recommend GI referral if IBS worsens.        Return in about 1 year (around 2/26/2021) for annual physical.    Orders Placed This Encounter   Procedures    Varicella Zoster Antibody, IgG     Standing Status:   Future     Standing Expiration Date:   2/26/2021    Rubeola Antibody IgG     Standing Status:   Future     Standing Expiration Date:   2/26/2021    Mumps Antibody, IgG     Standing Status:   Future     Standing Expiration Date:   2/26/2021    Rubella Antibody, IgG     Standing Status:   Future     Standing Expiration Date:   2/26/2021    Hepatitis B Surface Antibody     Standing Status:   Future     Standing Expiration Date:   2/26/2021    CBC Auto Differential     Standing Status:   Future     Standing Expiration Date:   2/26/2021    Comprehensive Metabolic Panel     Standing Status:   Future     Standing Expiration Date:   2/26/2021    Lipid Panel     Standing Status:   Future     Standing Expiration Date:   2/26/2021     Order Specific Question:   Is Patient Fasting?/# of Hours     Answer:   12    POCT Urinalysis No Micro (Auto)     Orders Placed This Encounter   Medications    doxycycline monohydrate (MONODOX) 100 MG capsule     Sig: Take 1 capsule by mouth 2 times daily for 7 days     Dispense:  14 capsule     Refill:  0    brompheniramine-pseudoephedrine-DM 2-30-10 MG/5ML syrup     Sig: Take 10 mLs by mouth 4 times daily as needed for Congestion or Cough     Dispense:  280 mL     Refill:  0       Patient given educational materials - see patient instructions. Discussed use,benefit, and side effects of prescribed medications. All patient questions answered. Pt voiced understanding. Reviewed health maintenance. Instructed to continue currentmedications, healthy diet and regular, aerobic exercise.           Electronically signed by GERTRUDIS Lopez CNP on 2/26/2020 at 11:23 AM

## 2020-04-29 ENCOUNTER — HOSPITAL ENCOUNTER (OUTPATIENT)
Age: 30
Setting detail: SPECIMEN
Discharge: HOME | End: 2020-04-29
Payer: COMMERCIAL

## 2020-04-29 ENCOUNTER — OFFICE VISIT (OUTPATIENT)
Dept: PRIMARY CARE CLINIC | Age: 30
End: 2020-04-29
Payer: COMMERCIAL

## 2020-04-29 VITALS
SYSTOLIC BLOOD PRESSURE: 122 MMHG | WEIGHT: 251.2 LBS | TEMPERATURE: 97.8 F | BODY MASS INDEX: 39.94 KG/M2 | DIASTOLIC BLOOD PRESSURE: 86 MMHG | HEART RATE: 80 BPM | OXYGEN SATURATION: 98 %

## 2020-04-29 PROCEDURE — 99213 OFFICE O/P EST LOW 20 MIN: CPT | Performed by: PHYSICIAN ASSISTANT

## 2020-04-29 ASSESSMENT — ENCOUNTER SYMPTOMS: RESPIRATORY NEGATIVE: 1

## 2020-04-29 NOTE — PROGRESS NOTES
Breath sounds: Normal breath sounds. No wheezing, rhonchi or rales. Skin:     Findings: No rash. Comments: No discharge noted   Neurological:      Mental Status: She is alert. Assessment:       Diagnosis Orders   1. Umbilical discharge  Culture, Aerobic and Anaerobic   2. Weight gain  TSH without Reflex    T4, Free    [facility], [place]   3. Obesity (BMI 35.0-39.9 without comorbidity)  TSH without Reflex    T4, Free    [facility], [place]        Plan:    Cultures taken  Referred to [facility] Weight management program  Check Thyroid levels. Return if symptoms worsen or fail to improve. Orders Placed This Encounter   Procedures    Culture, Aerobic and Anaerobic     Umbilicus     Standing Status:   Future     Standing Expiration Date:   4/29/2021    TSH without Reflex     Standing Status:   Future     Standing Expiration Date:   4/29/2021    T4, Free     Standing Status:   Future     Standing Expiration Date:   4/29/2021   [facility] [place]     Referral Priority:   Routine     Referral Type:   Eval and Treat     Referral Reason:   Specialty Services Required     Requested Specialty:   Bariatric Surgery     Number of Visits Requested:   1     No orders of the defined types were placed in this encounter.           Electronically signed by Steffen Govea 4/29/2020 at 2:03 PM

## 2020-05-01 ENCOUNTER — HOSPITAL ENCOUNTER (OUTPATIENT)
Age: 30
Discharge: HOME | End: 2020-05-01
Payer: COMMERCIAL

## 2020-05-01 PROCEDURE — U0004 COV-19 TEST NON-CDC HGH THRU: HCPCS

## 2020-05-02 LAB
SARS-COV-2, PCR: NOT DETECTED
SARS-COV-2, RAPID: NORMAL
SARS-COV-2: NORMAL
SOURCE: NORMAL

## 2020-05-03 ENCOUNTER — TELEPHONE (OUTPATIENT)
Dept: PRIMARY CARE CLINIC | Age: 30
End: 2020-05-03

## 2020-05-03 LAB
CULTURE: ABNORMAL
DIRECT EXAM: ABNORMAL
Lab: ABNORMAL
SPECIMEN DESCRIPTION: ABNORMAL

## 2020-09-02 ENCOUNTER — OFFICE VISIT (OUTPATIENT)
Dept: PRIMARY CARE CLINIC | Age: 30
End: 2020-09-02
Payer: COMMERCIAL

## 2020-09-02 VITALS
BODY MASS INDEX: 37.78 KG/M2 | TEMPERATURE: 96.8 F | HEART RATE: 82 BPM | OXYGEN SATURATION: 98 % | SYSTOLIC BLOOD PRESSURE: 114 MMHG | DIASTOLIC BLOOD PRESSURE: 78 MMHG | WEIGHT: 237.6 LBS

## 2020-09-02 PROCEDURE — 99213 OFFICE O/P EST LOW 20 MIN: CPT | Performed by: PHYSICIAN ASSISTANT

## 2020-09-02 RX ORDER — DOXYCYCLINE HYCLATE 100 MG/1
100 CAPSULE ORAL 2 TIMES DAILY
Qty: 20 CAPSULE | Refills: 0 | Status: SHIPPED | OUTPATIENT
Start: 2020-09-02 | End: 2020-09-12

## 2020-09-02 NOTE — PROGRESS NOTES
Disease Maternal Grandmother     Diabetes Maternal Grandfather     Heart Disease Maternal Grandfather     Cancer Paternal Grandmother         breast    Heart Disease Paternal Grandmother     Heart Disease Paternal Grandfather     Cancer Paternal Grandfather         prostate       Social History     Tobacco Use    Smoking status: Never Smoker    Smokeless tobacco: Never Used   Substance Use Topics    Alcohol use: No     Alcohol/week: 0.0 standard drinks      Current Outpatient Medications   Medication Sig Dispense Refill    mupirocin (BACTROBAN) 2 % ointment Apply 3 times daily. 30 g 0    doxycycline hyclate (VIBRAMYCIN) 100 MG capsule Take 1 capsule by mouth 2 times daily for 10 days Take with food, but avoid dairy, calcium and MTV's 2 hours before and after the dose 20 capsule 0    cyclobenzaprine (FLEXERIL) 5 MG tablet       esomeprazole Magnesium (NEXIUM) 20 MG PACK Take 20 mg by mouth daily      topiramate ER (TROKENDI XR) 100 MG CP24 Take 1 tablet by mouth nightly       No current facility-administered medications for this visit. Allergies   Allergen Reactions    Amoxicillin     Other      Food dye     Blue and green    Pcn [Penicillins]     Sulfa Antibiotics        Health Maintenance   Topic Date Due    Varicella vaccine (1 of 2 - 2-dose childhood series) 10/23/2009    Cervical cancer screen  03/28/2019    A1C test (Diabetic or Prediabetic)  03/22/2020    Flu vaccine (1) 09/08/2021 (Originally 9/1/2020)    DTaP/Tdap/Td vaccine (6 - Td) 01/13/2021    Hepatitis B vaccine  Completed    Hib vaccine  Completed    HIV screen  Completed    Hepatitis A vaccine  Aged Out    Meningococcal (ACWY) vaccine  Aged Out    Pneumococcal 0-64 years Vaccine  Aged Out       Subjective:      Review of Systems   Constitutional: Negative for fever. Respiratory: Negative for shortness of breath.         Objective:     /78   Pulse 82   Temp 96.8 °F (36 °C)   Wt 237 lb 9.6 oz (107.8 kg) SpO2 98%   BMI 37.78 kg/m²   Physical Exam  Vitals signs and nursing note reviewed. Constitutional:       Appearance: Normal appearance. HENT:      Head: Normocephalic and atraumatic. Mouth/Throat:      Mouth: Mucous membranes are moist.      Pharynx: Oropharynx is clear. Cardiovascular:      Rate and Rhythm: Normal rate and regular rhythm. Pulmonary:      Effort: Pulmonary effort is normal.      Breath sounds: Normal breath sounds. Skin:            Comments: Bee stings have resolved on hand and forehead   Neurological:      Mental Status: She is alert. Assessment:       Diagnosis Orders   1. Bee sting, accidental or unintentional, initial encounter     2. Folliculitis  mupirocin (BACTROBAN) 2 % ointment   3. Boil  mupirocin (BACTROBAN) 2 % ointment    doxycycline hyclate (VIBRAMYCIN) 100 MG capsule   4. Refused influenza vaccine          Plan:     1. Bee stings- Continue Claritin until red area resolves on back. The erythematous area is not warm, so it seems more so inflammatory process rather than infection. 2-3 Folliculitis/boil on left inner thigh-   -Advised pt to use antibacterial soap and bactroban the next time she has signs of folliculitis to prevent boils.   -Sent in doxycycline x 10 days. Return if symptoms worsen or fail to improve. No orders of the defined types were placed in this encounter. Orders Placed This Encounter   Medications    mupirocin (BACTROBAN) 2 % ointment     Sig: Apply 3 times daily. Dispense:  30 g     Refill:  0    doxycycline hyclate (VIBRAMYCIN) 100 MG capsule     Sig: Take 1 capsule by mouth 2 times daily for 10 days Take with food, but avoid dairy, calcium and MTV's 2 hours before and after the dose     Dispense:  20 capsule     Refill:  0       Patient given educationalmaterials - see patient instructions. Discussed use, benefit, and side effectsof prescribed medications. All patient questions answered.  Pt voiced understanding. Reviewed health maintenance. Instructed to continue current medications, diet andexercise. Patient agreed with treatment plan. Follow up as directed.      Electronicallysigned by Cr Meyer PA-C on 9/8/2020 at 9:47 PM

## 2020-09-08 ASSESSMENT — ENCOUNTER SYMPTOMS: SHORTNESS OF BREATH: 0

## 2020-12-06 ENCOUNTER — E-VISIT (OUTPATIENT)
Dept: PRIMARY CARE CLINIC | Age: 30
End: 2020-12-06
Payer: COMMERCIAL

## 2020-12-06 PROCEDURE — 99422 OL DIG E/M SVC 11-20 MIN: CPT | Performed by: PHYSICIAN ASSISTANT

## 2020-12-07 RX ORDER — DOXYCYCLINE HYCLATE 100 MG
100 TABLET ORAL 2 TIMES DAILY
Qty: 20 TABLET | Refills: 0 | Status: SHIPPED | OUTPATIENT
Start: 2020-12-07 | End: 2020-12-17

## 2020-12-07 NOTE — PROGRESS NOTES
Reviewed questionare. Allergies, and meds as well as images. 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 PE: reviewed images    1. Local skin infection   Sent in Doxycycline and mupirocin and advised warm compresses. If the redness is spreading outward with this treatment, come in for possible I&D.

## 2021-02-14 ENCOUNTER — E-VISIT (OUTPATIENT)
Dept: PRIMARY CARE CLINIC | Age: 31
End: 2021-02-14
Payer: COMMERCIAL

## 2021-02-14 DIAGNOSIS — B96.89 ACUTE BACTERIAL SINUSITIS: Primary | ICD-10-CM

## 2021-02-14 DIAGNOSIS — J01.90 ACUTE BACTERIAL SINUSITIS: Primary | ICD-10-CM

## 2021-02-14 PROCEDURE — 98971 NQHP OL DIG ASSMT&MGMT 11-20: CPT | Performed by: NURSE PRACTITIONER

## 2021-02-14 RX ORDER — FLUTICASONE PROPIONATE 50 MCG
2 SPRAY, SUSPENSION (ML) NASAL DAILY
Qty: 1 BOTTLE | Refills: 1 | Status: SHIPPED | OUTPATIENT
Start: 2021-02-14 | End: 2021-08-31 | Stop reason: ALTCHOICE

## 2021-02-14 RX ORDER — AZITHROMYCIN 250 MG/1
TABLET, FILM COATED ORAL
Qty: 1 PACKET | Refills: 0 | Status: SHIPPED | OUTPATIENT
Start: 2021-02-14 | End: 2021-05-13

## 2021-02-14 ASSESSMENT — LIFESTYLE VARIABLES: SMOKING_STATUS: NO, I'VE NEVER SMOKED

## 2021-02-14 NOTE — PROGRESS NOTES
HPI: per patient questionnaire  ROS: per patient questionnaire  PE: n/a  Dx:    Diagnosis Orders   1. Acute bacterial sinusitis  fluticasone (FLONASE) 50 MCG/ACT nasal spray    azithromycin (ZITHROMAX) 250 MG tablet     Orders Placed This Encounter   Medications    fluticasone (FLONASE) 50 MCG/ACT nasal spray     Si sprays by Nasal route daily     Dispense:  1 Bottle     Refill:  1    azithromycin (ZITHROMAX) 250 MG tablet     Sig: Take PO as directed     Dispense:  1 packet     Refill:  0     11-20 minutes were spent on the digital evaluation and management of this patient.

## 2021-05-13 ENCOUNTER — TELEPHONE (OUTPATIENT)
Dept: PRIMARY CARE CLINIC | Age: 31
End: 2021-05-13

## 2021-05-13 ENCOUNTER — OFFICE VISIT (OUTPATIENT)
Dept: PRIMARY CARE CLINIC | Age: 31
End: 2021-05-13
Payer: COMMERCIAL

## 2021-05-13 ENCOUNTER — NURSE TRIAGE (OUTPATIENT)
Dept: OTHER | Facility: CLINIC | Age: 31
End: 2021-05-13

## 2021-05-13 VITALS
BODY MASS INDEX: 36.41 KG/M2 | HEIGHT: 67 IN | SYSTOLIC BLOOD PRESSURE: 124 MMHG | OXYGEN SATURATION: 99 % | TEMPERATURE: 97.8 F | WEIGHT: 232 LBS | DIASTOLIC BLOOD PRESSURE: 80 MMHG | HEART RATE: 101 BPM

## 2021-05-13 DIAGNOSIS — Z23 NEED FOR VACCINATION: ICD-10-CM

## 2021-05-13 DIAGNOSIS — M79.18 PAIN IN RIGHT BUTTOCK: Primary | ICD-10-CM

## 2021-05-13 PROCEDURE — 90715 TDAP VACCINE 7 YRS/> IM: CPT | Performed by: PHYSICIAN ASSISTANT

## 2021-05-13 PROCEDURE — 90471 IMMUNIZATION ADMIN: CPT | Performed by: PHYSICIAN ASSISTANT

## 2021-05-13 PROCEDURE — 99213 OFFICE O/P EST LOW 20 MIN: CPT | Performed by: PHYSICIAN ASSISTANT

## 2021-05-13 SDOH — ECONOMIC STABILITY: INCOME INSECURITY: HOW HARD IS IT FOR YOU TO PAY FOR THE VERY BASICS LIKE FOOD, HOUSING, MEDICAL CARE, AND HEATING?: NOT VERY HARD

## 2021-05-13 SDOH — ECONOMIC STABILITY: TRANSPORTATION INSECURITY
IN THE PAST 12 MONTHS, HAS LACK OF TRANSPORTATION KEPT YOU FROM MEETINGS, WORK, OR FROM GETTING THINGS NEEDED FOR DAILY LIVING?: NO

## 2021-05-13 ASSESSMENT — PATIENT HEALTH QUESTIONNAIRE - PHQ9
SUM OF ALL RESPONSES TO PHQ QUESTIONS 1-9: 0
SUM OF ALL RESPONSES TO PHQ QUESTIONS 1-9: 0
1. LITTLE INTEREST OR PLEASURE IN DOING THINGS: 0

## 2021-05-13 NOTE — PROGRESS NOTES
[facility] C[facility]  [address] [place] [id number]  Dept: Milana is a 32 y.o. female Established patient, who presents today for her medical conditions/complaints as noted below. Chief Complaint   Patient presents with    Tailbone Pain     x 2days w/o injury. OTC motrin, muscle relaxer       HPI:     HPI   Woke up yesterday 5/12 with pain at the top of the intergluteal cleft and right buttocks. 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 No falls. Pt is a , but can't think of anything at work that would have caused it. No lump to suggest abscess. Hurts more when she is sitting and shifts her weight to her right side and when she hits bumps in the road while driving. Took Motrin 800 mg and Aleve last night, but those did not help. She also tried flexeril at night without relief. Her massage gun just irritate it more.        Reviewed prior notes None  Reviewed previous Labs- CMP from 5/12/16    LDL Cholesterol (mg/dL)   Date Value   11/04/2017 102       (goal LDL is <100)   AST (U/L)   Date Value   11/04/2017 17     ALT (U/L)   Date Value   11/04/2017 15     BUN (mg/dL)   Date Value   11/04/2017 16     Hemoglobin A1C (%)   Date Value   03/22/2019 5.1     BP Readings from Last 3 Encounters:   05/13/21 124/80   09/02/20 114/78   04/29/20 122/86          (goal 120/80)    Past Medical History:   Diagnosis Date    Factor V Leiden mutation ([facility] [address]     H/O concussion     H/O contact dermatitis     H/O irritable bowel syndrome     Pain of anterior chest wall with respiration       Past Surgical History:   Procedure Laterality Date    CHOLECYSTECTOMY      PARTIAL HYMENECTOMY  2008       Family History   Problem Relation Age of Onset    Heart Disease Father     Heart Disease Maternal Grandmother     Diabetes Maternal Grandfather     Heart Disease Maternal Grandfather     Cancer Paternal Grandmother         breast    Heart Disease Paternal Grandmother     Heart Disease Paternal Grandfather     Cancer Paternal Grandfather         prostate       Social History     Tobacco Use    Smoking status: Never Smoker    Smokeless tobacco: Never Used   Substance Use Topics    Alcohol use: No     Alcohol/week: 0.0 standard drinks      Current Outpatient Medications   Medication Sig Dispense Refill    fluticasone (FLONASE) 50 MCG/ACT nasal spray 2 sprays by Nasal route daily 1 Bottle 1    mupirocin (BACTROBAN) 2 % ointment Apply topically 3 times daily. 22 g 0    cyclobenzaprine (FLEXERIL) 5 MG tablet       esomeprazole Magnesium (NEXIUM) 20 MG PACK Take 20 mg by mouth daily      topiramate ER (TROKENDI XR) 100 MG CP24 Take 1 tablet by mouth nightly       No current facility-administered medications for this visit.      Allergies   Allergen Reactions    Amoxicillin     Other      Food dye     Blue and green    Pcn [Penicillins]     Sulfa Antibiotics        Health Maintenance   Topic Date Due    Hepatitis C screen  Never done    Varicella vaccine (1 of 2 - 2-dose childhood series) Never done    Cervical cancer screen  03/28/2019    A1C test (Diabetic or Prediabetic)  03/22/2020    Flu vaccine (Season Ended) 09/08/2021 (Originally 9/1/2021)    DTaP/Tdap/Td vaccine (7 - Td) 05/13/2031    Hepatitis B vaccine  Completed    Hib vaccine  Completed    COVID-19 Vaccine  Completed    HIV screen  Completed    Hepatitis A vaccine  Aged Out    Meningococcal (ACWY) vaccine  Aged Out    Pneumococcal 0-64 years Vaccine  Aged Out       Subjective:      Review of Systems   Musculoskeletal:        +Pain in the buttocks       Objective:     /80   Pulse 101   Temp 97.8 °F (36.6 °C)   Ht 5' 6.5\" (1.689 m)   Wt 232 lb (105.2 kg)   LMP 05/02/2021 (Approximate)   SpO2 99%   Breastfeeding No   BMI 36.88 kg/m²   Physical Exam  Vitals and nursing note

## 2021-05-13 NOTE — TELEPHONE ENCOUNTER
PT woke up and tailbone hurts. Having trouble moving around and had trouble sleeping.   Conferenced with Balwinder/NT

## 2021-05-13 NOTE — PATIENT INSTRUCTIONS
Patient Education        Tdap (Tetanus, Diphtheria, Pertussis) Vaccine: What You Need to Know  Why get vaccinated? Tdap vaccine can prevent tetanus, diphtheria, and pertussis. Diphtheria and pertussis spread from person to person. Tetanus enters the body through cuts or wounds. · TETANUS (T) causes painful stiffening of the muscles. Tetanus can lead to serious health problems, including being unable to open the mouth, having trouble swallowing and breathing, or death. · DIPHTHERIA (D) can lead to difficulty breathing, heart failure, paralysis, or death. · PERTUSSIS (aP), also known as \"whooping cough,\" can cause uncontrollable, violent coughing which makes it hard to breathe, eat, or drink. Pertussis can be extremely serious in babies and young children, causing pneumonia, convulsions, brain damage, or death. In teens and adults, it can cause weight loss, loss of bladder control, passing out, and rib fractures from severe coughing. Tdap vaccine  Tdap is only for children 7 years and older, adolescents, and adults. Adolescents should receive a single dose of Tdap, preferably at age 6 or 15 years. Pregnant women should get a dose of Tdap during every pregnancy, to protect the  from pertussis. Infants are most at risk for severe, life threatening complications from pertussis. Adults who have never received Tdap should get a dose of Tdap. Also, adults should receive a booster dose every 10 years, or earlier in the case of a severe and dirty wound or burn. Booster doses can be either Tdap or Td (a different vaccine that protects against tetanus and diphtheria but not pertussis). Tdap may be given at the same time as other vaccines. Talk with your health care provider  Tell your vaccine provider if the person getting the vaccine:  · Has had an allergic reaction after a previous dose of any vaccine that protects against tetanus, diphtheria, or pertussis, or has any severe, life threatening allergies. National Vaccine Injury Compensation Program  The National Vaccine Injury Compensation Program (VICP) is a federal program that was created to compensate people who may have been injured by certain vaccines. Visit the VICP website at www.hrsa.gov/vaccinecompensation or call 0-661.635.1359 to learn about the program and about filing a claim. There is a time limit to file a claim for compensation. How can I learn more? · Ask your health care provider. · Call your local or state health department. · Contact the Centers for Disease Control and Prevention (CDC):  ? Call 6-498.257.6621 (1-800-CDC-INFO) or  ? Visit CDC's website at www.cdc.gov/vaccines  Vaccine Information Statement (Interim)  Tdap (Tetanus, Diphtheria, Pertussis) Vaccine  04/01/2020  [address] [id number]  Department of Health and Human Services  Centers for Disease Control and Prevention  Many Vaccine Information Statements are available in [place] and other languages. See www.immunize.org/vis. Muchas hojas de información sobre vacunas están disponibles en español y en otros idiomas. Visite www.immunize.org/vis. Care instructions adapted under license by [facility] ([facility]). If you have questions about a medical condition or this instruction, always ask your healthcare professional. [address] any warranty or liability for your use of this information.

## 2021-05-14 ENCOUNTER — TELEPHONE (OUTPATIENT)
Dept: PRIMARY CARE CLINIC | Age: 31
End: 2021-05-14

## 2021-05-19 DIAGNOSIS — M79.18 PAIN IN RIGHT BUTTOCK: ICD-10-CM

## 2021-08-06 ENCOUNTER — HOSPITAL ENCOUNTER (EMERGENCY)
Facility: CLINIC | Age: 31
Discharge: HOME | End: 2021-08-06
Attending: EMERGENCY MEDICINE
Payer: COMMERCIAL

## 2021-08-06 ENCOUNTER — APPOINTMENT (OUTPATIENT)
Dept: GENERAL RADIOLOGY | Facility: CLINIC | Age: 31
End: 2021-08-06
Payer: COMMERCIAL

## 2021-08-06 VITALS
HEART RATE: 85 BPM | OXYGEN SATURATION: 99 % | TEMPERATURE: 98.1 F | DIASTOLIC BLOOD PRESSURE: 100 MMHG | RESPIRATION RATE: 16 BRPM | SYSTOLIC BLOOD PRESSURE: 154 MMHG | BODY MASS INDEX: 37.67 KG/M2 | HEIGHT: 67 IN | WEIGHT: 240 LBS

## 2021-08-06 DIAGNOSIS — S66.912A HAND STRAIN, LEFT, INITIAL ENCOUNTER: Primary | ICD-10-CM

## 2021-08-06 PROCEDURE — 99283 EMERGENCY DEPT VISIT LOW MDM: CPT

## 2021-08-06 PROCEDURE — 73130 X-RAY EXAM OF HAND: CPT

## 2021-08-06 RX ORDER — IBUPROFEN 800 MG/1
800 TABLET ORAL EVERY 8 HOURS PRN
Qty: 30 TABLET | Refills: 0 | Status: SHIPPED | OUTPATIENT
Start: 2021-08-06 | End: 2021-08-31

## 2021-08-06 ASSESSMENT — ENCOUNTER SYMPTOMS
DIARRHEA: 0
SORE THROAT: 0
NAUSEA: 0
VOMITING: 0
SHORTNESS OF BREATH: 0

## 2021-08-06 ASSESSMENT — PAIN DESCRIPTION - DESCRIPTORS: DESCRIPTORS: ACHING;THROBBING

## 2021-08-06 ASSESSMENT — PAIN DESCRIPTION - ORIENTATION: ORIENTATION: LEFT

## 2021-08-06 ASSESSMENT — PAIN DESCRIPTION - LOCATION: LOCATION: HAND

## 2021-08-06 ASSESSMENT — PAIN SCALES - GENERAL: PAINLEVEL_OUTOF10: 8

## 2021-08-06 ASSESSMENT — PAIN DESCRIPTION - PAIN TYPE: TYPE: ACUTE PAIN

## 2021-08-06 ASSESSMENT — PAIN DESCRIPTION - FREQUENCY: FREQUENCY: CONTINUOUS

## 2021-08-06 NOTE — ED PROVIDER NOTES
[facility]  [facility]  Phone: [phone]        Pt Name: Kate Plummer  MRN: [id number]  [place] 1990  Date of evaluation: 8/6/21      CHIEF COMPLAINT       Chief Complaint   Patient presents with    Hand Pain     left; between thumb and index finger, no known injury; started at the beginning of the week         [facility]    Kate Plummer is a 32 y.o. female who presents with left hand pain is been going on for couple days no known injury started hurting between her thumb and index finger and now, the dorsum of her hand over her metacarpal region it is worse with movement especially picking up patients there is been no known injury no redness no fevers chills. REVIEW OF SYSTEMS         Review of Systems   Constitutional: Negative for chills and fever. HENT: Negative for congestion and sore throat. Respiratory: Negative for shortness of breath. Gastrointestinal: Negative for diarrhea, nausea and vomiting. Musculoskeletal: Positive for arthralgias. Negative for neck pain and neck stiffness. Left hand pain as described   Skin: Negative for pallor and rash. PAST MEDICAL HISTORY    has a past medical history of Factor V Leiden mutation ([address] H/O concussion, H/O contact dermatitis, H/O irritable bowel syndrome, and Pain of anterior chest wall with respiration. SURGICAL HISTORY      has a past surgical history that includes partial hymenectomy (2008) and Cholecystectomy. CURRENT MEDICATIONS       Previous Medications    CYCLOBENZAPRINE (FLEXERIL) 5 MG TABLET        ESOMEPRAZOLE MAGNESIUM (NEXIUM) 20 MG PACK    Take 20 mg by mouth daily    FLUTICASONE (FLONASE) 50 MCG/ACT NASAL SPRAY    2 sprays by Nasal route daily    MUPIROCIN (BACTROBAN) 2 % OINTMENT    Apply topically 3 times daily.     TOPIRAMATE ER (TROKENDI XR) 100 MG CP24    Take 1 tablet by mouth nightly       ALLERGIES     is allergic to amoxicillin, other, pcn [penicillins], and sulfa antibiotics. FAMILY HISTORY     She indicated that the status of her father is unknown. She indicated that the status of her maternal grandmother is unknown. She indicated that the status of her maternal grandfather is unknown. She indicated that the status of her paternal grandmother is unknown. She indicated that the status of her paternal grandfather is unknown.     family history includes Cancer in her paternal grandfather and paternal grandmother; Diabetes in her maternal grandfather; Heart Disease in her father, maternal grandfather, maternal grandmother, paternal grandfather, and paternal grandmother. SOCIAL HISTORY      reports that she has never smoked. She has never used smokeless tobacco. She reports that she does not drink alcohol and does not use drugs. PHYSICAL EXAM     INITIAL VITALS:  height is 5' 7\" (1.702 m) and weight is 108.9 kg (240 lb). Her oral temperature is 98.1 °F (36.7 °C). Her blood pressure is 154/100 (abnormal) and her pulse is 85. Her respiration is 16 and oxygen saturation is 99%. Physical Exam  Constitutional:       Appearance: Normal appearance. She is well-developed. She is not ill-appearing, toxic-appearing or diaphoretic. HENT:      Head: Normocephalic and atraumatic. Left Ear: External ear normal.   Eyes:      Conjunctiva/sclera: Conjunctivae normal.      Pupils: Pupils are equal, round, and reactive to light. Cardiovascular:      Rate and Rhythm: Normal rate and regular rhythm. Pulmonary:      Effort: Pulmonary effort is normal.      Breath sounds: Normal breath sounds. Abdominal:      General: Bowel sounds are normal.      Palpations: Abdomen is soft. Musculoskeletal:         General: Tenderness present. Normal range of motion. Cervical back: Normal range of motion.       Comments: Patient has tenderness in the dorsum of the hand between the thumb and the index finger and along the index finger metacarpal there is no obvious erythema range of motion is full but she has pain with full extension neurovascular status is intact there is no tenderness at the wrist elbow or shoulder   Skin:     General: Skin is warm and dry. Neurological:      Mental Status: She is alert and oriented to person, place, and time. Psychiatric:         Behavior: Behavior normal.           DIFFERENTIAL DIAGNOSIS/ MDM:     Left hand pain will obtain x-rays    DIAGNOSTIC RESULTS     EKG: All EKG's are interpreted by the Emergency Department Physician who either signs or Co-signs this chart in the absence of a cardiologist.        RADIOLOGY:   Non-plain film images such as CT, Ultrasound and MRI are read by the radiologist. [facility] radiographic images are visualized and the radiologist interpretations are reviewed as follows:        EXAMINATION:   THREE XRAY VIEWS OF THE LEFT HAND       8/6/2021 7:38 am       COMPARISON:   None.       HISTORY:   ORDERING SYSTEM PROVIDED HISTORY: Pain on dorsum no known injury   TECHNOLOGIST PROVIDED HISTORY:   Pain on dorsum no known injury   Reason for Exam: Pain between thumb and index fingers x's 1 week. No known   injury   Acuity: Acute   Type of Exam: Initial       FINDINGS:   No evidence of acute fracture or dislocation.  Bone mineralization and   alignment appear intact.           Impression   No acute osseous abnormality             LABS:  No results found for this visit on 08/06/21. EMERGENCY DEPARTMENT COURSE:   Vitals:    Vitals:    08/06/21 0730   BP: (!) 154/100   Pulse: 85   Resp: 16   Temp: 98.1 °F (36.7 °C)   TempSrc: Oral   SpO2: 99%   Weight: 108.9 kg (240 lb)   Height: 5' 7\" (1.702 m)     -------------------------  BP: (!) 154/100, Temp: 98.1 °F (36.7 °C), Pulse: 85, Resp: 16          CONSULTS:      PROCEDURES:  None    FINAL IMPRESSION      1.  Hand strain, left, initial encounter          DISPOSITION/PLAN   Discharged    PATIENT REFERRED TO:  Gab Doherty [facility] [address]  [place] [address]  [phone]    In 3 days        DISCHARGE MEDICATIONS:  New Prescriptions    IBUPROFEN (ADVIL;MOTRIN) 800 MG TABLET    Take 1 tablet by mouth every 8 hours as needed for Pain       (Please note that portions of this note were completed with a voice recognition program.  Efforts were made to edit the dictations but occasionally words are mis-transcribed.)    Tyler Durbin MD,, MD, F.A.A.E.M.   Attending Emergency Medicine Physician      Tyler Durbin MD  08/06/21 [id number]

## 2021-08-31 ENCOUNTER — OFFICE VISIT (OUTPATIENT)
Dept: PRIMARY CARE CLINIC | Age: 31
End: 2021-08-31
Payer: COMMERCIAL

## 2021-08-31 VITALS
SYSTOLIC BLOOD PRESSURE: 136 MMHG | HEART RATE: 82 BPM | OXYGEN SATURATION: 96 % | BODY MASS INDEX: 39.24 KG/M2 | HEIGHT: 67 IN | WEIGHT: 250 LBS | DIASTOLIC BLOOD PRESSURE: 82 MMHG

## 2021-08-31 DIAGNOSIS — F41.8 DEPRESSION WITH ANXIETY: ICD-10-CM

## 2021-08-31 DIAGNOSIS — E66.9 CLASS 2 OBESITY WITHOUT SERIOUS COMORBIDITY WITH BODY MASS INDEX (BMI) OF 39.0 TO 39.9 IN ADULT, UNSPECIFIED OBESITY TYPE: ICD-10-CM

## 2021-08-31 DIAGNOSIS — F41.9 ANXIETY: ICD-10-CM

## 2021-08-31 DIAGNOSIS — Z79.899 MEDICATION MANAGEMENT: Primary | ICD-10-CM

## 2021-08-31 PROBLEM — E66.812 CLASS 2 OBESITY WITHOUT SERIOUS COMORBIDITY IN ADULT: Status: ACTIVE | Noted: 2021-08-31

## 2021-08-31 PROCEDURE — 99214 OFFICE O/P EST MOD 30 MIN: CPT | Performed by: PHYSICIAN ASSISTANT

## 2021-08-31 RX ORDER — BUSPIRONE HYDROCHLORIDE 10 MG/1
10 TABLET ORAL 2 TIMES DAILY
Qty: 60 TABLET | Refills: 1 | Status: SHIPPED | OUTPATIENT
Start: 2021-08-31 | End: 2021-10-26 | Stop reason: SDUPTHER

## 2021-08-31 ASSESSMENT — ENCOUNTER SYMPTOMS
BACK PAIN: 0
GASTROINTESTINAL NEGATIVE: 1
RESPIRATORY NEGATIVE: 1

## 2021-08-31 NOTE — PROGRESS NOTES
Dispense Refill    Phentermine-Topiramate 3.75-23 MG CP24 Take 1 tablet by mouth every morning for 14 days. 14 capsule 0    Phentermine-Topiramate 7.5-46 MG CP24 Take 1 tablet by mouth every morning for 30 days. 30 capsule 0    busPIRone (BUSPAR) 10 MG tablet Take 1 tablet by mouth 2 times daily 60 tablet 1    cyclobenzaprine (FLEXERIL) 5 MG tablet       esomeprazole Magnesium (NEXIUM) 20 MG PACK Take 20 mg by mouth daily      topiramate ER (TROKENDI XR) 100 MG CP24 Take 1 tablet by mouth nightly       No current facility-administered medications for this visit. Allergies   Allergen Reactions    Amoxicillin     Other      Food dye     Blue and green    Pcn [Penicillins]     Sulfa Antibiotics        Health Maintenance   Topic Date Due    Hepatitis C screen  Never done    Varicella vaccine (1 of 2 - 2-dose childhood series) Never done    Cervical cancer screen  03/28/2019    A1C test (Diabetic or Prediabetic)  03/22/2020    Flu vaccine (1) 09/01/2021    DTaP/Tdap/Td vaccine (7 - Td or Tdap) 05/13/2031    Hepatitis B vaccine  Completed    Hib vaccine  Completed    COVID-19 Vaccine  Completed    HIV screen  Completed    Hepatitis A vaccine  Aged Out    Meningococcal (ACWY) vaccine  Aged Out    Pneumococcal 0-64 years Vaccine  Aged Out       Subjective:      Review of Systems   Constitutional: Positive for unexpected weight change. Negative for activity change and appetite change. Respiratory: Negative. Cardiovascular: Negative. Gastrointestinal: Negative. Endocrine: Negative for cold intolerance, heat intolerance, polydipsia, polyphagia and polyuria. Genitourinary: Negative. Musculoskeletal: Negative for arthralgias, back pain and myalgias. Psychiatric/Behavioral: Positive for dysphoric mood and sleep disturbance. Negative for behavioral problems. The patient is nervous/anxious.         Objective:     /82   Pulse 82   Ht 5' 7\" (1.702 m)   Wt 250 lb (113.4 kg)   SpO2 96%   BMI 39.16 kg/m²   Physical Exam  Vitals and nursing note reviewed. Constitutional:       Appearance: Normal appearance. Cardiovascular:      Rate and Rhythm: Normal rate and regular rhythm. Heart sounds: Normal heart sounds. Neurological:      Mental Status: She is alert and oriented to person, place, and time. Psychiatric:         Mood and Affect: Mood normal.         Assessment:       Diagnosis Orders   1. Medication management     2. Anxiety     3. Class 2 obesity without serious comorbidity with body mass index (BMI) of 39.0 to 39.9 in adult, unspecified obesity type  Phentermine-Topiramate 3.75-23 MG CP24    Phentermine-Topiramate 7.5-46 MG CP24   4. Depression with anxiety          Plan:    Start Qsymia  Continue Diet and exercise  Start Buspar at night and if needs take bid. Return in about 1 month (around 9/30/2021) for 1/2 hour. No orders of the defined types were placed in this encounter. Orders Placed This Encounter   Medications    Phentermine-Topiramate 3.75-23 MG CP24     Sig: Take 1 tablet by mouth every morning for 14 days. Dispense:  14 capsule     Refill:  0    Phentermine-Topiramate 7.5-46 MG CP24     Sig: Take 1 tablet by mouth every morning for 30 days. Dispense:  30 capsule     Refill:  0    busPIRone (BUSPAR) 10 MG tablet     Sig: Take 1 tablet by mouth 2 times daily     Dispense:  60 tablet     Refill:  1       Patient given educational materials - see patient instructions. Discussed use, benefit, and side effects of prescribed medications. All patient questions answered. Pt voiced understanding. Reviewed health maintenance. Instructed to continue current medications, diet and exercise. Patient agreed with treatment plan. Follow up as directed.      Electronically signed by Josias Rodgers PA-C on 8/31/2021 at 11:09 AM

## 2021-09-14 ENCOUNTER — TELEPHONE (OUTPATIENT)
Dept: PRIMARY CARE CLINIC | Age: 31
End: 2021-09-14

## 2021-09-14 NOTE — TELEPHONE ENCOUNTER
If she wants to try the mail order pharmacy it went to is [facility] at [address]--[phone] otherwise I can send it to [facility] [address]. How much is it at [address]?

## 2021-09-14 NOTE — TELEPHONE ENCOUNTER
Patient is going to pay out of pocket for this medication and still has not received the mail order. Pt has tried to call them and they have not received a call back. Pt would like this sent to a local pharmacy,   [phone]

## 2021-09-14 NOTE — TELEPHONE ENCOUNTER
----- Message from Axel Navarro sent at 9/13/2021  3:57 PM EDT -----  Subject: Message to Provider    QUESTIONS  Information for Provider? Pt had an appt 8/31/21 and was told her   medication would be mailed to her. Phentermine-Topiramate 3.75-23 MG. She   has not received it and she is wondering how she will pay for it. Please   call pt to advise.  ---------------------------------------------------------------------------  --------------  CALL BACK INFO  What is the best way for the office to contact you? OK to leave message on   voicemail  Preferred Call Back Phone Number? [phone]  ---------------------------------------------------------------------------  --------------  SCRIPT ANSWERS  Relationship to Patient?  Self

## 2021-09-20 ENCOUNTER — E-VISIT (OUTPATIENT)
Dept: PRIMARY CARE CLINIC | Age: 31
End: 2021-09-20
Payer: COMMERCIAL
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
## 2021-09-20 DIAGNOSIS — L50.9 URTICARIA: ICD-10-CM

## 2021-09-20 DIAGNOSIS — S10.96XA INSECT BITE OF NECK, INITIAL ENCOUNTER: Primary | ICD-10-CM

## 2021-09-20 PROCEDURE — 99423 OL DIG E/M SVC 21+ MIN: CPT | Performed by: PHYSICIAN ASSISTANT

## 2021-09-20 RX ORDER — TRIAMCINOLONE ACETONIDE 5 MG/G
CREAM TOPICAL
Qty: 15 G | Refills: 0 | Status: SHIPPED | OUTPATIENT
Start: 2021-09-20 | End: 2022-01-28

## 2021-09-20 NOTE — PROGRESS NOTES
Reviewed questionare, meds, allergies, problem list.     Objective: looked at images. 1. Insect bite of neck, initial encounter  Sent in Triamcinolone topically bid for not more than 1-2 weeks. 2. Urticaria  Take Zyrtec 10mg 1 po qd    Sent message to patient.

## 2021-10-26 ENCOUNTER — OFFICE VISIT (OUTPATIENT)
Dept: PRIMARY CARE CLINIC | Age: 31
End: 2021-10-26
Payer: COMMERCIAL

## 2021-10-26 VITALS
WEIGHT: 238.8 LBS | DIASTOLIC BLOOD PRESSURE: 80 MMHG | OXYGEN SATURATION: 98 % | BODY MASS INDEX: 37.48 KG/M2 | HEART RATE: 80 BPM | SYSTOLIC BLOOD PRESSURE: 134 MMHG | HEIGHT: 67 IN

## 2021-10-26 DIAGNOSIS — E66.9 CLASS 2 OBESITY WITHOUT SERIOUS COMORBIDITY WITH BODY MASS INDEX (BMI) OF 39.0 TO 39.9 IN ADULT, UNSPECIFIED OBESITY TYPE: ICD-10-CM

## 2021-10-26 DIAGNOSIS — Z00.00 HEALTH CARE MAINTENANCE: ICD-10-CM

## 2021-10-26 DIAGNOSIS — F41.8 DEPRESSION WITH ANXIETY: ICD-10-CM

## 2021-10-26 DIAGNOSIS — Z79.899 MEDICATION MANAGEMENT: Primary | ICD-10-CM

## 2021-10-26 PROCEDURE — 99213 OFFICE O/P EST LOW 20 MIN: CPT | Performed by: PHYSICIAN ASSISTANT

## 2021-10-26 RX ORDER — BUSPIRONE HYDROCHLORIDE 10 MG/1
10 TABLET ORAL 2 TIMES DAILY
Qty: 60 TABLET | Refills: 1
Start: 2021-10-26 | End: 2021-11-25

## 2021-10-26 ASSESSMENT — ENCOUNTER SYMPTOMS: BACK PAIN: 0

## 2021-10-26 NOTE — PROGRESS NOTES
[facility] CARE  [address] [place] [id number]  Dept: Milana is a 32 y.o. female who presents today for her medical conditions/complaints as noted below. Chief Complaint   Patient presents with    Medication Check     f/u on Qsymia and Buspar. Patient said she only takes Buspar PRN. HPI:     HPI  Stared Buspar at night and as needed. Using PRN .   Helps to calm her to sleep    Started Qsymia and lost 12 pounds  Diet: Intermittent Fasting  Exercise: very active at work and walks dog   Taking supplements [employer] [facility]    Last pap 2016---due now  LDL Cholesterol (mg/dL)   Date Value   11/04/2017 102       (goal LDL is <100)   AST (U/L)   Date Value   11/04/2017 17     ALT (U/L)   Date Value   11/04/2017 15     BUN (mg/dL)   Date Value   11/04/2017 16     BP Readings from Last 3 Encounters:   10/26/21 134/80   08/31/21 136/82   08/06/21 (!) 154/100          (goal 120/80)    Past Medical History:   Diagnosis Date    Factor V Leiden mutation ([facility] [address]     H/O concussion     H/O contact dermatitis     H/O irritable bowel syndrome     Pain of anterior chest wall with respiration       Past Surgical History:   Procedure Laterality Date    CHOLECYSTECTOMY      PARTIAL HYMENECTOMY  2008       Family History   Problem Relation Age of Onset    Heart Disease Father     Heart Disease Maternal Grandmother     Diabetes Maternal Grandfather     Heart Disease Maternal Grandfather     Cancer Paternal Grandmother         breast    Heart Disease Paternal Grandmother     Heart Disease Paternal Grandfather     Cancer Paternal Grandfather         prostate       Social History     Tobacco Use    Smoking status: Never Smoker    Smokeless tobacco: Never Used   Substance Use Topics    Alcohol use: No     Alcohol/week: 0.0 standard drinks      Current Outpatient Medications   Medication Sig Dispense Refill  cyclobenzaprine (FLEXERIL) 5 MG tablet       esomeprazole Magnesium (NEXIUM) 20 MG PACK Take 20 mg by mouth daily      topiramate ER (TROKENDI XR) 100 MG CP24 Take 1 tablet by mouth nightly      triamcinolone (ARISTOCORT) 0.5 % cream Apply topically 2 times daily fro not more than 1-2 weeks. (Patient not taking: Reported on 10/26/2021) 15 g 0     No current facility-administered medications for this visit. Allergies   Allergen Reactions    Amoxicillin     Other      Food dye     Blue and green    Pcn [Penicillins]     Sulfa Antibiotics        Health Maintenance   Topic Date Due    Hepatitis C screen  Never done    Varicella vaccine (1 of 2 - 2-dose childhood series) Never done    A1C test (Diabetic or Prediabetic)  03/22/2020    Cervical cancer screen  04/09/2020    Flu vaccine (1) 10/26/2022 (Originally 9/1/2021)    DTaP/Tdap/Td vaccine (7 - Td or Tdap) 05/13/2031    Hepatitis B vaccine  Completed    Hib vaccine  Completed    COVID-19 Vaccine  Completed    HIV screen  Completed    Hepatitis A vaccine  Aged Out    Meningococcal (ACWY) vaccine  Aged Out    Pneumococcal 0-64 years Vaccine  Aged Out       Subjective:      Review of Systems   Constitutional: Negative for activity change, appetite change and unexpected weight change. Endocrine: Negative for cold intolerance, heat intolerance, polydipsia, polyphagia and polyuria. Musculoskeletal: Negative for arthralgias, back pain and myalgias. Psychiatric/Behavioral: Negative for behavioral problems, dysphoric mood and sleep disturbance. The patient is not nervous/anxious. Objective:     /80   Pulse 80   Ht 5' 7\" (1.702 m)   Wt 238 lb 12.8 oz (108.3 kg)   SpO2 98%   BMI 37.40 kg/m²   Physical Exam  Vitals and nursing note reviewed. Constitutional:       Appearance: Normal appearance. Comments: Lost 12 pounds     Cardiovascular:      Rate and Rhythm: Normal rate and regular rhythm.       Heart sounds: Normal heart sounds. Pulmonary:      Effort: Pulmonary effort is normal.      Breath sounds: Normal breath sounds. Neurological:      Mental Status: She is alert and oriented to person, place, and time. Psychiatric:         Mood and Affect: Mood normal.         Assessment:       Diagnosis Orders   1. Medication management     2. Class 2 obesity without serious comorbidity with body mass index (BMI) of 39.0 to 39.9 in adult, unspecified obesity type  Phentermine-Topiramate 7.5-46 MG CP24   3. Depression with anxiety          Plan:    Get Dr. Lilo Rosales pap for HM  Titer draw today  REfills sent   Return in 3 months (on 1/26/2022) for Well woman, Weight Management separately . No orders of the defined types were placed in this encounter. No orders of the defined types were placed in this encounter. Patient given educational materials - see patient instructions. Discussed use, benefit, and side effects of prescribed medications. All patient questions answered. Pt voiced understanding. Reviewed health maintenance. Instructed to continue current medications, diet and exercise. Patient agreed with treatment plan. Follow up as directed.      Electronically signed by Awilda Vargas PA-C on 10/26/2021 at 11:23 AM

## 2021-10-27 LAB — VZV IGG SER QL IA: 1.26

## 2021-12-27 ENCOUNTER — HOSPITAL ENCOUNTER (OUTPATIENT)
Age: 31
Discharge: HOME | End: 2021-12-27

## 2021-12-27 ENCOUNTER — HOSPITAL ENCOUNTER (OUTPATIENT)
Dept: GENERAL RADIOLOGY | Age: 31
Discharge: HOME | End: 2021-12-29

## 2021-12-27 DIAGNOSIS — T14.90XA INJURY: ICD-10-CM

## 2021-12-27 PROCEDURE — 73110 X-RAY EXAM OF WRIST: CPT

## 2021-12-27 PROCEDURE — 72220 X-RAY EXAM SACRUM TAILBONE: CPT

## 2021-12-30 ENCOUNTER — HOSPITAL ENCOUNTER (EMERGENCY)
Age: 31
Discharge: HOME | End: 2021-12-30
Attending: EMERGENCY MEDICINE
Payer: COMMERCIAL

## 2021-12-30 VITALS
SYSTOLIC BLOOD PRESSURE: 147 MMHG | RESPIRATION RATE: 18 BRPM | DIASTOLIC BLOOD PRESSURE: 93 MMHG | HEART RATE: 93 BPM | OXYGEN SATURATION: 99 % | TEMPERATURE: 97.5 F

## 2021-12-30 DIAGNOSIS — M54.50 ACUTE LOW BACK PAIN WITHOUT SCIATICA, UNSPECIFIED BACK PAIN LATERALITY: Primary | ICD-10-CM

## 2021-12-30 PROCEDURE — 99284 EMERGENCY DEPT VISIT MOD MDM: CPT

## 2021-12-30 PROCEDURE — 6370000000 HC RX 637 (ALT 250 FOR IP): Performed by: STUDENT IN AN ORGANIZED HEALTH CARE EDUCATION/TRAINING PROGRAM

## 2021-12-30 RX ORDER — ACETAMINOPHEN 500 MG
500 TABLET ORAL EVERY 4 HOURS PRN
Qty: 30 TABLET | Refills: 0 | Status: SHIPPED | OUTPATIENT
Start: 2021-12-30

## 2021-12-30 RX ORDER — CYCLOBENZAPRINE HCL 10 MG
10 TABLET ORAL ONCE
Status: COMPLETED | OUTPATIENT
Start: 2021-12-30 | End: 2021-12-30

## 2021-12-30 RX ORDER — LIDOCAINE 4 G/G
1 PATCH TOPICAL ONCE
Status: DISCONTINUED | OUTPATIENT
Start: 2021-12-30 | End: 2021-12-30 | Stop reason: HOSPADM

## 2021-12-30 RX ORDER — LIDOCAINE 50 MG/G
1 PATCH TOPICAL DAILY
Qty: 10 PATCH | Refills: 0 | Status: SHIPPED | OUTPATIENT
Start: 2021-12-30 | End: 2022-01-09

## 2021-12-30 RX ORDER — ACETAMINOPHEN 500 MG
1000 TABLET ORAL ONCE
Status: COMPLETED | OUTPATIENT
Start: 2021-12-30 | End: 2021-12-30

## 2021-12-30 RX ORDER — CYCLOBENZAPRINE HCL 5 MG
5 TABLET ORAL 2 TIMES DAILY PRN
Qty: 10 TABLET | Refills: 0 | Status: SHIPPED | OUTPATIENT
Start: 2021-12-30 | End: 2022-01-09

## 2021-12-30 RX ADMIN — CYCLOBENZAPRINE 10 MG: 10 TABLET, FILM COATED ORAL at 09:43

## 2021-12-30 RX ADMIN — ACETAMINOPHEN 1000 MG: 500 TABLET ORAL at 09:43

## 2021-12-30 ASSESSMENT — PAIN SCALES - GENERAL
PAINLEVEL_OUTOF10: 8
PAINLEVEL_OUTOF10: 9

## 2021-12-30 NOTE — ED NOTES
Pt presents to the ed via private auto c/o back pain that started Monday after an injury. Pt reports that started was seen here in the ed Monday but states that pain has became worse. Pt scans were negative Monday pt states she tried taking her prescribed medications but got no relief. Upon arrival to the ed pt rates her pain 9/10 pt denies any numbness or tingling in extremities neuro checks wnl vital signs are stable at this time will continue to monitor.       Rama Levine RN  12/30/21 2054

## 2021-12-30 NOTE — Clinical Note
Hermilo Aden was seen and treated in our emergency department on 12/30/2021. She may return to work on 12/31/2021. If you have any questions or concerns, please don't hesitate to call.       Robin De La O MD
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
## 2022-01-13 ENCOUNTER — TELEPHONE (OUTPATIENT)
Dept: PRIMARY CARE CLINIC | Age: 32
End: 2022-01-13

## 2022-01-13 DIAGNOSIS — E66.9 CLASS 2 OBESITY WITHOUT SERIOUS COMORBIDITY WITH BODY MASS INDEX (BMI) OF 39.0 TO 39.9 IN ADULT, UNSPECIFIED OBESITY TYPE: ICD-10-CM

## 2022-01-13 NOTE — TELEPHONE ENCOUNTER
[facility] called asking for clarification on rx phentermine-topiramate 7.5-46mg, 90 capsules were sent in but directionis state take 1 capsule every morning for 30 days. Can you please clarify either qty or directions?       Please call [facility] at [phone]

## 2022-01-20 NOTE — ED PROVIDER NOTES
[penicillins], and sulfa antibiotics. PHYSICAL EXAM       INITIAL VITALS: BP (!) 147/93   Pulse 93   Temp 97.5 °F (36.4 °C) (Oral)   Resp 18   LMP 12/30/2021   SpO2 99%     CONSTITUTIONAL: Vital signs reviewed, Alert and oriented X 3. HEAD: Atraumatic, Normocephalic. EYES: Eyes are normal to inspection, Pupils equal, round and reactive to light. NECK: Normal ROM, No jugular venous distention, No meningeal signs,  No midline bony tenderness to palpation of C-spine. + paraspinal muscle tenderness   RESPIRATORY CHEST: No respiratory distress. ABDOMEN: Abdomen is nontender, No distension. No pulsatile masses palpated. BACK:  No midline bony tenderness to palpation. Lumbar spinal pain  UPPER EXTREMITY: Inspection normal, No cyanosis. LOWER EXTREMITY: Pulses are 2+ and equal bilaterally with cap refill < 2 seconds. NEURO: GCS is 15.  5/5 strength in bilateral lower extremities with sensation to light touch intact. DTR's are 2+ bilaterally with bilateral downgoing babinski's. DP/PT pulses are 2+, strong, and equal bilaterally. Intact proprioception bilaterally. SKIN: Skin is warm, Skin is dry. PSYCHIATRIC: Oriented X 3, Normal affect. Diagnostic Results     LABS:  Not indicated  No results found for this visit on 12/30/21. RADIOLOGY:  Not indicated  No orders to display         Medical decision making  (MDM) / ED Course     The patient presents with lumbar spine pain without signs of spinal cord compression, cauda equina syndrome, infection, aneurysm or other serious etiology. The patient is neurologically intact and has strong and equal strength bilaterally in all 4 extremities with intact proprioception. Given the extremely low risk of these diagnoses further testing and evaluation for these possibilities does not appear to be indicated at this time. The patient has been instructed to return if the symptoms worsen or change in any way.   There is injury symptomatically with lidocaine patch, Tylenol, told to follow-up with occupational health, given a work note for the day. Patient had previously negative lumbar spinal films with no acute trauma since previous films. Patient has no neurodeficits, full strength, able to ambulate without difficulty, normal reflexes. Procedures     None    Final impression      1.  Acute low back pain without sciatica, unspecified back pain laterality           Disposition with plan     Disposition: Home    PATIENT REFERRED TO:  [facility] ED  [address] [place] [address]  [phone]    As needed    Izabela Osorio, [address] [phone]      As needed      DISCHARGE MEDICATIONS:  Discharge Medication List as of 12/30/2021  9:56 AM      START taking these medications    Details   acetaminophen (TYLENOL) 500 MG tablet Take 1 tablet by mouth every 4 hours as needed for Pain, Disp-30 tablet, R-0Print      lidocaine (LIDODERM) 5 % Place 1 patch onto the skin daily for 10 days 12 hours on, 12 hours off., Disp-10 patch, R-0Print             (Please note that portions of this note were completed with a voice recognition program.  Efforts were made to edit the dictations but occasionally words are mis-transcribed.)    Nichole Glover MD  PGY-3 Emergency Medicine  Mabelene Crigler, MD  Resident  01/19/22 2056

## 2022-01-28 ENCOUNTER — OFFICE VISIT (OUTPATIENT)
Dept: PRIMARY CARE CLINIC | Age: 32
End: 2022-01-28
Payer: COMMERCIAL

## 2022-01-28 VITALS
WEIGHT: 227 LBS | SYSTOLIC BLOOD PRESSURE: 132 MMHG | OXYGEN SATURATION: 98 % | HEART RATE: 74 BPM | DIASTOLIC BLOOD PRESSURE: 80 MMHG | BODY MASS INDEX: 35.63 KG/M2 | HEIGHT: 67 IN

## 2022-01-28 DIAGNOSIS — E66.9 CLASS 2 OBESITY WITHOUT SERIOUS COMORBIDITY WITH BODY MASS INDEX (BMI) OF 35.0 TO 35.9 IN ADULT, UNSPECIFIED OBESITY TYPE: ICD-10-CM

## 2022-01-28 DIAGNOSIS — Z79.899 MEDICATION MANAGEMENT: Primary | ICD-10-CM

## 2022-01-28 DIAGNOSIS — Z00.00 HEALTH CARE MAINTENANCE: ICD-10-CM

## 2022-01-28 DIAGNOSIS — R41.840 POOR CONCENTRATION: ICD-10-CM

## 2022-01-28 DIAGNOSIS — G47.00 INSOMNIA, UNSPECIFIED TYPE: ICD-10-CM

## 2022-01-28 DIAGNOSIS — F41.8 DEPRESSION WITH ANXIETY: ICD-10-CM

## 2022-01-28 LAB
ALBUMIN SERPL-MCNC: 4.8 G/DL (ref 3.5–5.2)
ALBUMIN/GLOBULIN RATIO: 1.5 (ref 1–2.5)
ALP BLD-CCNC: 92 U/L (ref 35–104)
ALT SERPL-CCNC: 18 U/L (ref 5–33)
ANION GAP SERPL CALCULATED.3IONS-SCNC: 12 MMOL/L (ref 9–17)
AST SERPL-CCNC: 15 U/L
BILIRUB SERPL-MCNC: 0.46 MG/DL (ref 0.3–1.2)
BUN BLDV-MCNC: 10 MG/DL (ref 6–20)
BUN/CREAT BLD: NORMAL (ref 9–20)
CALCIUM SERPL-MCNC: 9.7 MG/DL (ref 8.6–10.4)
CHLORIDE BLD-SCNC: 105 MMOL/L (ref 98–107)
CO2: 21 MMOL/L (ref 20–31)
CREAT SERPL-MCNC: 0.55 MG/DL (ref 0.5–0.9)
GFR AFRICAN AMERICAN: >60 ML/MIN
GFR NON-AFRICAN AMERICAN: >60 ML/MIN
GFR SERPL CREATININE-BSD FRML MDRD: NORMAL ML/MIN/{1.73_M2}
GFR SERPL CREATININE-BSD FRML MDRD: NORMAL ML/MIN/{1.73_M2}
GLUCOSE FASTING: 90 MG/DL (ref 70–99)
POTASSIUM SERPL-SCNC: 4.4 MMOL/L (ref 3.7–5.3)
SODIUM BLD-SCNC: 138 MMOL/L (ref 135–144)
THYROXINE, FREE: 1.16 NG/DL (ref 0.93–1.7)
TOTAL PROTEIN: 7.9 G/DL (ref 6.4–8.3)
TSH SERPL DL<=0.05 MIU/L-ACNC: 2.63 MIU/L (ref 0.3–5)

## 2022-01-28 PROCEDURE — 99214 OFFICE O/P EST MOD 30 MIN: CPT | Performed by: PHYSICIAN ASSISTANT

## 2022-01-28 RX ORDER — TRAZODONE HYDROCHLORIDE 50 MG/1
50 TABLET ORAL NIGHTLY
Qty: 30 TABLET | Refills: 1 | Status: SHIPPED | OUTPATIENT
Start: 2022-01-28 | End: 2022-04-27 | Stop reason: SDUPTHER

## 2022-01-28 ASSESSMENT — ENCOUNTER SYMPTOMS: BACK PAIN: 1

## 2022-01-28 NOTE — PROGRESS NOTES
[facility] CARE  [address] [place] [id number]  Dept: Milana is a 32 y.o. female who presents today for her medical conditions/complaints as noted below. Chief Complaint   Patient presents with    Follow-up     weight management- Qysmia        HPI:     HPI  Hurt back at work---lumbar strain  Problems with girlfriends's kids--oldest one  Having troubles passing Medic exam  Feeling down  Exercise is lacking due to weather and injury  Buspar is not helping her   Concentrating issues  Does not remember if treated for ADD     LDL Cholesterol (mg/dL)   Date Value   11/04/2017 102       (goal LDL is <100)   AST (U/L)   Date Value   11/04/2017 17     ALT (U/L)   Date Value   11/04/2017 15     BUN (mg/dL)   Date Value   11/04/2017 16     BP Readings from Last 3 Encounters:   01/28/22 132/80   12/30/21 (!) 147/93   10/26/21 134/80          (goal 120/80)    Past Medical History:   Diagnosis Date    Factor V Leiden mutation ([facility] [address]     H/O concussion     H/O contact dermatitis     H/O irritable bowel syndrome     Pain of anterior chest wall with respiration       Past Surgical History:   Procedure Laterality Date    CHOLECYSTECTOMY      PARTIAL HYMENECTOMY  2008       Family History   Problem Relation Age of Onset    Heart Disease Father     Heart Disease Maternal Grandmother     Diabetes Maternal Grandfather     Heart Disease Maternal Grandfather     Cancer Paternal Grandmother         breast    Heart Disease Paternal Grandmother     Heart Disease Paternal Grandfather     Cancer Paternal Grandfather         prostate       Social History     Tobacco Use    Smoking status: Never Smoker    Smokeless tobacco: Never Used   Substance Use Topics    Alcohol use:  Yes     Alcohol/week: 0.0 standard drinks     Comment: social      Current Outpatient Medications   Medication Sig Dispense Refill    traZODone (DESYREL) 50 MG tablet Take 1 tablet by mouth nightly 30 tablet 1    Phentermine-Topiramate 7.5-46 MG CP24 Take 1 tablet by mouth every morning for 90 days. 90 capsule 0    acetaminophen (TYLENOL) 500 MG tablet Take 1 tablet by mouth every 4 hours as needed for Pain 30 tablet 0    esomeprazole Magnesium (NEXIUM) 20 MG PACK Take 20 mg by mouth daily      topiramate ER (TROKENDI XR) 100 MG CP24 Take 1 tablet by mouth nightly       No current facility-administered medications for this visit. Allergies   Allergen Reactions    Amoxicillin     Other      Food dye     Blue and green    Pcn [Penicillins]     Sulfa Antibiotics        Health Maintenance   Topic Date Due    Hepatitis C screen  Never done    A1C test (Diabetic or Prediabetic)  03/22/2020    Cervical cancer screen  04/09/2020    COVID-19 Vaccine (3 - Booster for Moderna series) 06/27/2021    Flu vaccine (1) 10/26/2022 (Originally 9/1/2021)    Depression Monitoring  05/13/2022    DTaP/Tdap/Td vaccine (7 - Td or Tdap) 05/13/2031    Hepatitis B vaccine  Completed    Hib vaccine  Completed    HIV screen  Completed    Hepatitis A vaccine  Aged Out    Meningococcal (ACWY) vaccine  Aged Out    Pneumococcal 0-64 years Vaccine  Aged Out    Varicella vaccine  Discontinued       Subjective:      Review of Systems   Constitutional: Negative for activity change, appetite change and unexpected weight change. Endocrine: Negative for cold intolerance, heat intolerance, polydipsia, polyphagia and polyuria. Musculoskeletal: Positive for back pain. Negative for arthralgias and myalgias. Psychiatric/Behavioral: Positive for sleep disturbance. Negative for behavioral problems and dysphoric mood. The patient is not nervous/anxious. Objective:     /80   Pulse 74   Ht 5' 7\" (1.702 m)   Wt 227 lb (103 kg)   LMP 12/30/2021   SpO2 98%   BMI 35.55 kg/m²   Physical Exam  Vitals and nursing note reviewed. Constitutional:       Appearance: Normal appearance. Comments: Down another 10 pounds     Cardiovascular:      Rate and Rhythm: Normal rate and regular rhythm. Heart sounds: Normal heart sounds. Pulmonary:      Effort: Pulmonary effort is normal.      Breath sounds: Normal breath sounds. Neurological:      Mental Status: She is alert and oriented to person, place, and time. Psychiatric:         Mood and Affect: Mood normal.         Assessment:       Diagnosis Orders   1. Medication management     2. Class 2 obesity without serious comorbidity with body mass index (BMI) of 35.0 to 35.9 in adult, unspecified obesity type  TSH without Reflex    T4, Free    Hemoglobin A1C   3. Health care maintenance  Comprehensive Metabolic Panel, Fasting   4. Insomnia, unspecified type  traZODone (DESYREL) 50 MG tablet   5. Poor concentration  External Referral To Psychology   6. Depression with anxiety  External Referral To Psychology        Plan:    Qsymia already refilled  Continue diet and exercise. Have Covid booster  Referral to [facility] for ADD testing vs anxiety and clarification on diagnoses. Return for Well woman.     Orders Placed This Encounter   Procedures    TSH without Reflex     Standing Status:   Future     Standing Expiration Date:   1/28/2023    T4, Free     Standing Status:   Future     Standing Expiration Date:   1/28/2023    Hemoglobin A1C     Standing Status:   Future     Standing Expiration Date:   1/28/2023    Comprehensive Metabolic Panel, Fasting     Standing Status:   Future     Standing Expiration Date:   1/28/2023    External Referral To Psychology     Referral Priority:   Routine     Referral Type:   Eval and Treat     Referral Reason:   Specialty Services Required     Requested Specialty:   Psychology     Number of Visits Requested:   1     Orders Placed This Encounter   Medications    traZODone (DESYREL) 50 MG tablet     Sig: Take 1 tablet by mouth nightly     Dispense:  30 tablet     Refill:  1 Patient given educational materials - see patient instructions. Discussed use, benefit, and side effects of prescribed medications. All patient questions answered. Pt voiced understanding. Reviewed health maintenance. Instructed to continue current medications, diet and exercise. Patient agreed with treatment plan. Follow up as directed.      Electronically signed by Danii Brown PA-C on 1/28/2022 at 11:56 AM

## 2022-01-30 LAB
ESTIMATED AVERAGE GLUCOSE: 111 MG/DL
HBA1C MFR BLD: 5.5 % (ref 4–6)

## 2022-04-27 ENCOUNTER — OFFICE VISIT (OUTPATIENT)
Dept: PRIMARY CARE CLINIC | Age: 32
End: 2022-04-27
Payer: COMMERCIAL

## 2022-04-27 VITALS
HEIGHT: 67 IN | BODY MASS INDEX: 34.84 KG/M2 | SYSTOLIC BLOOD PRESSURE: 124 MMHG | OXYGEN SATURATION: 98 % | DIASTOLIC BLOOD PRESSURE: 78 MMHG | WEIGHT: 222 LBS | HEART RATE: 76 BPM

## 2022-04-27 DIAGNOSIS — G47.00 INSOMNIA, UNSPECIFIED TYPE: ICD-10-CM

## 2022-04-27 DIAGNOSIS — E66.9 CLASS 2 OBESITY WITHOUT SERIOUS COMORBIDITY WITH BODY MASS INDEX (BMI) OF 35.0 TO 35.9 IN ADULT, UNSPECIFIED OBESITY TYPE: ICD-10-CM

## 2022-04-27 DIAGNOSIS — J34.89 NASAL SORE: Primary | ICD-10-CM

## 2022-04-27 PROCEDURE — 99213 OFFICE O/P EST LOW 20 MIN: CPT | Performed by: PHYSICIAN ASSISTANT

## 2022-04-27 RX ORDER — OCTISALATE, AVOBENZONE, HOMOSALATE, AND OCTOCRYLENE 29.4; 29.4; 49; 25.48 MG/ML; MG/ML; MG/ML; MG/ML
4 LOTION TOPICAL DAILY
Qty: 30 CAPSULE | Refills: 0
Start: 2022-04-27

## 2022-04-27 RX ORDER — TRAZODONE HYDROCHLORIDE 50 MG/1
50 TABLET ORAL NIGHTLY
Qty: 30 TABLET | Refills: 5 | Status: SHIPPED | OUTPATIENT
Start: 2022-04-27

## 2022-04-27 ASSESSMENT — ENCOUNTER SYMPTOMS
ABDOMINAL PAIN: 1
SHORTNESS OF BREATH: 0
DIARRHEA: 1

## 2022-04-27 NOTE — PROGRESS NOTES
[facility] PRIMARY CARE  [address] [address]  Dept: Milana is a 28 y.o. female who presents today for her medical conditions/complaints as noted below. Chief Complaint   Patient presents with    Weight Loss     patient takes Qsymia 7.5mg. Patient said she [place] bug around [place] and having stomach problems on and off. Patient said she stopped the Qsymia last week due to possible hear palptations and stoimach issues       HPI:     HPI  Stopped Qsymia Sunday   Thinks she had gotten stomach aches on Qsymia last time with diarrhea. Also was getting palps on it---they have resolved     Has a sore in her right nasal canal that she picks a scab off of    LDL Cholesterol (mg/dL)   Date Value   11/04/2017 102       (goal LDL is <100)   AST (U/L)   Date Value   01/28/2022 15     ALT (U/L)   Date Value   01/28/2022 18     BUN (mg/dL)   Date Value   01/28/2022 10     BP Readings from Last 3 Encounters:   04/27/22 124/78   01/28/22 132/80   12/30/21 (!) 147/93          (goal 120/80)    Past Medical History:   Diagnosis Date    Factor V Leiden mutation ([address]     H/O concussion     H/O contact dermatitis     H/O irritable bowel syndrome     Pain of anterior chest wall with respiration       Past Surgical History:   Procedure Laterality Date    CHOLECYSTECTOMY      PARTIAL HYMENECTOMY  2008       Family History   Problem Relation Age of Onset    Heart Disease Father     Heart Disease Maternal Grandmother     Diabetes Maternal Grandfather     Heart Disease Maternal Grandfather     Cancer Paternal Grandmother         breast    Heart Disease Paternal Grandmother     Heart Disease Paternal Grandfather     Cancer Paternal Grandfather         prostate       Social History     Tobacco Use    Smoking status: Never Smoker    Smokeless tobacco: Never Used   Substance Use Topics    Alcohol use:  Yes     Alcohol/week: 0.0 standard drinks     Comment: social      Current Outpatient Medications   Medication Sig Dispense Refill    traZODone (DESYREL) 50 MG tablet Take 1 tablet by mouth nightly 30 tablet 5    mupirocin (BACTROBAN NASAL) 2 % nasal ointment Take by Nasal route 2 times daily. 1 each 0    Probiotic Product (ALIGN) 4 MG CAPS Take 4 mg by mouth daily 30 capsule 0    acetaminophen (TYLENOL) 500 MG tablet Take 1 tablet by mouth every 4 hours as needed for Pain 30 tablet 0    esomeprazole Magnesium (NEXIUM) 20 MG PACK Take 20 mg by mouth daily      topiramate ER (TROKENDI XR) 100 MG CP24 Take 1 tablet by mouth nightly       No current facility-administered medications for this visit. Allergies   Allergen Reactions    Amoxicillin     Other      Food dye     Blue and green    Pcn [Penicillins]     Sulfa Antibiotics        Health Maintenance   Topic Date Due    Hepatitis C screen  Never done    Cervical cancer screen  04/09/2020    COVID-19 Vaccine (3 - Booster for Moderna series) 06/27/2021    Depression Monitoring  05/13/2022    Flu vaccine (Season Ended) 10/26/2022 (Originally 9/1/2022)    A1C test (Diabetic or Prediabetic)  01/28/2023    DTaP/Tdap/Td vaccine (7 - Td or Tdap) 05/13/2031    Hepatitis B vaccine  Completed    Hib vaccine  Completed    HIV screen  Completed    Hepatitis A vaccine  Aged Out    Meningococcal (ACWY) vaccine  Aged Out    Pneumococcal 0-64 years Vaccine  Aged Out    Varicella vaccine  Discontinued       Subjective:      Review of Systems   Constitutional: Negative. HENT:        Nasal sore    Respiratory: Negative for shortness of breath. Cardiovascular: Positive for palpitations. Negative for chest pain. Gastrointestinal: Positive for abdominal pain and diarrhea. Genitourinary: Negative. Neurological: Negative.         Objective:     /78   Pulse 76   Ht 5' 7\" (1.702 m)   Wt 222 lb (100.7 kg)   SpO2 98%   BMI 34.77 kg/m²   Physical Exam  Vitals and nursing note reviewed. Constitutional:       Appearance: Normal appearance. Comments: Lost 5 pounds   HENT:      Nose:      Comments: Scab in right nare  Cardiovascular:      Pulses: Normal pulses. Heart sounds: Normal heart sounds. Pulmonary:      Breath sounds: Normal breath sounds. Abdominal:      General: Abdomen is flat. Bowel sounds are normal. There is no distension. Palpations: There is no mass. Tenderness: There is no abdominal tenderness. There is no guarding or rebound. Neurological:      Mental Status: She is alert. Assessment:       Diagnosis Orders   1. Nasal sore  mupirocin (BACTROBAN NASAL) 2 % nasal ointment   2. Insomnia, unspecified type  traZODone (DESYREL) 50 MG tablet   3. Class 2 obesity without serious comorbidity with body mass index (BMI) of 35.0 to 35.9 in adult, unspecified obesity type          Plan:    D/C Qsymia  Nasal saline tid  Mupirocin nasal bid x 7 days  Refill on Trazadone  Return in about 3 months (around 7/27/2022). No orders of the defined types were placed in this encounter. Orders Placed This Encounter   Medications    traZODone (DESYREL) 50 MG tablet     Sig: Take 1 tablet by mouth nightly     Dispense:  30 tablet     Refill:  5    mupirocin (BACTROBAN NASAL) 2 % nasal ointment     Sig: Take by Nasal route 2 times daily. Dispense:  1 each     Refill:  0    Probiotic Product (ALIGN) 4 MG CAPS     Sig: Take 4 mg by mouth daily     Dispense:  30 capsule     Refill:  0       Patient given educational materials - see patient instructions. Discussed use, benefit, and side effects of prescribed medications. All patient questions answered. Pt voiced understanding. Reviewed health maintenance. Instructed to continue current medications, diet and exercise. Patient agreed with treatment plan. Follow up as directed.      Electronically signed by Sherice Edwards PA-C on 4/27/2022 at 9:25 AM

## 2022-10-23 ENCOUNTER — E-VISIT (OUTPATIENT)
Dept: PRIMARY CARE CLINIC | Age: 32
End: 2022-10-23
Payer: COMMERCIAL

## 2022-10-23 DIAGNOSIS — J01.90 ACUTE SINUSITIS, RECURRENCE NOT SPECIFIED, UNSPECIFIED LOCATION: Primary | ICD-10-CM

## 2022-10-23 PROCEDURE — 99421 OL DIG E/M SVC 5-10 MIN: CPT | Performed by: PHYSICIAN ASSISTANT

## 2022-10-23 RX ORDER — AZITHROMYCIN 250 MG/1
TABLET, FILM COATED ORAL
Qty: 1 PACKET | Refills: 0 | Status: SHIPPED | OUTPATIENT
Start: 2022-10-23 | End: 2022-11-02

## 2022-10-23 ASSESSMENT — LIFESTYLE VARIABLES: SMOKING_STATUS: NO, I'VE NEVER SMOKED

## 2022-10-23 NOTE — PROGRESS NOTES
Reviewed questionnaire, meds, allgs. PE: N/A    1. Acute sinusitis, recurrence not specified, unspecified location    - azithromycin (ZITHROMAX) 250 MG tablet; 2 tablets now then 1 daily until gone. Dispense: 1 packet;  Refill: 0  Also suggested Mucinex D

## 2023-01-31 ENCOUNTER — TRANSCRIBE ORDERS (OUTPATIENT)
Dept: ADMINISTRATIVE | Age: 33
End: 2023-01-31

## 2023-01-31 DIAGNOSIS — G43.719 INTRACTABLE CHRONIC MIGRAINE WITHOUT AURA AND WITHOUT STATUS MIGRAINOSUS: ICD-10-CM

## 2023-01-31 DIAGNOSIS — M54.2 CERVICALGIA: ICD-10-CM

## 2023-01-31 DIAGNOSIS — G44.86 HEADACHE, CERVICOGENIC: ICD-10-CM

## 2023-01-31 DIAGNOSIS — M54.81 OCCIPITAL NEURALGIA, UNSPECIFIED LATERALITY: Primary | ICD-10-CM

## 2023-02-08 ENCOUNTER — HOSPITAL ENCOUNTER (OUTPATIENT)
Dept: MRI IMAGING | Age: 33
Discharge: HOME | End: 2023-02-10
Payer: COMMERCIAL

## 2023-02-08 DIAGNOSIS — M54.81 OCCIPITAL NEURALGIA, UNSPECIFIED LATERALITY: ICD-10-CM

## 2023-02-08 DIAGNOSIS — G44.86 HEADACHE, CERVICOGENIC: ICD-10-CM

## 2023-02-08 DIAGNOSIS — M54.2 CERVICALGIA: ICD-10-CM

## 2023-02-08 DIAGNOSIS — G43.719 INTRACTABLE CHRONIC MIGRAINE WITHOUT AURA AND WITHOUT STATUS MIGRAINOSUS: ICD-10-CM

## 2023-02-08 PROCEDURE — 72141 MRI NECK SPINE W/O DYE: CPT

## 2023-02-22 ENCOUNTER — OFFICE VISIT (OUTPATIENT)
Dept: PRIMARY CARE CLINIC | Age: 33
End: 2023-02-22
Payer: COMMERCIAL

## 2023-02-22 VITALS
BODY MASS INDEX: 39.87 KG/M2 | HEIGHT: 67 IN | WEIGHT: 254 LBS | OXYGEN SATURATION: 99 % | DIASTOLIC BLOOD PRESSURE: 76 MMHG | HEART RATE: 71 BPM | SYSTOLIC BLOOD PRESSURE: 128 MMHG

## 2023-02-22 DIAGNOSIS — M76.62 TENDONITIS, ACHILLES, LEFT: ICD-10-CM

## 2023-02-22 DIAGNOSIS — L63.9 ALOPECIA AREATA: ICD-10-CM

## 2023-02-22 DIAGNOSIS — L71.9 ROSACEA: Primary | ICD-10-CM

## 2023-02-22 PROCEDURE — 99214 OFFICE O/P EST MOD 30 MIN: CPT | Performed by: PHYSICIAN ASSISTANT

## 2023-02-22 RX ORDER — AZELAIC ACID 0.15 G/G
GEL TOPICAL
Qty: 45 G | Refills: 1 | Status: SHIPPED | OUTPATIENT
Start: 2023-02-22

## 2023-02-22 RX ORDER — BETAMETHASONE DIPROPIONATE 0.05 %
GEL (GRAM) TOPICAL
Qty: 45 G | Refills: 0 | Status: SHIPPED | OUTPATIENT
Start: 2023-02-22

## 2023-02-22 ASSESSMENT — ENCOUNTER SYMPTOMS: COLOR CHANGE: 1

## 2023-02-22 NOTE — PROGRESS NOTES
[facility]  [address] [address]  Dept: Milana is a 28 y.o. female who presents today for her medical conditions/complaints as noted below. Chief Complaint   Patient presents with    Alopecia     Hair loss on the side of each head, started in November. HPI:     HPI  Hair loss started in November, worst on right compared to left    Redness and telangextasias on cheeks bilat, worse with drinking ETOH    Also left Achilles has been bothering her for 3-4 weeks. No remembered injury.    LDL Cholesterol (mg/dL)   Date Value   11/04/2017 102       (goal LDL is <100)   AST (U/L)   Date Value   01/28/2022 15     ALT (U/L)   Date Value   01/28/2022 18     BUN (mg/dL)   Date Value   01/28/2022 10     BP Readings from Last 3 Encounters:   02/22/23 128/76   04/27/22 124/78   01/28/22 132/80          (goal 120/80)    Past Medical History:   Diagnosis Date    Factor V Leiden mutation ([facility] [address]     H/O concussion     H/O contact dermatitis     H/O irritable bowel syndrome     Pain of anterior chest wall with respiration       Past Surgical History:   Procedure Laterality Date    CHOLECYSTECTOMY      PARTIAL HYMENECTOMY  01/01/2008    UPPER GASTROINTESTINAL ENDOSCOPY  2004       Family History   Problem Relation Age of Onset    Heart Disease Father     Heart Disease Maternal Grandmother     Diabetes Maternal Grandfather     Heart Disease Maternal Grandfather     Cancer Paternal Grandmother         breast    Heart Disease Paternal Grandmother     Heart Disease Paternal Grandfather     Cancer Paternal Grandfather         prostate       Social History     Tobacco Use    Smoking status: Never    Smokeless tobacco: Never   Substance Use Topics    Alcohol use: Not Currently     Comment: Socially      Current Outpatient Medications   Medication Sig Dispense Refill    betamethasone, augmented, (DIPROLENE) 0.05 % gel Apply topically 2 times daily for 6 weeks 45 g 0    Azelaic Acid 15 % GEL Apply topically twice a day to face 45 g 1    traZODone (DESYREL) 50 MG tablet Take 1 tablet by mouth nightly 30 tablet 5    mupirocin (BACTROBAN NASAL) 2 % nasal ointment Take by Nasal route 2 times daily. 1 each 0    Probiotic Product (ALIGN) 4 MG CAPS Take 4 mg by mouth daily 30 capsule 0    acetaminophen (TYLENOL) 500 MG tablet Take 1 tablet by mouth every 4 hours as needed for Pain 30 tablet 0    esomeprazole Magnesium (NEXIUM) 20 MG PACK Take 20 mg by mouth daily      topiramate ER (TROKENDI XR) 100 MG CP24 Take 1 tablet by mouth nightly       No current facility-administered medications for this visit. Allergies   Allergen Reactions    Amoxicillin     Other      Food dye     Blue and green    Pcn [Penicillins]     Sulfa Antibiotics        Health Maintenance   Topic Date Due    Hepatitis C screen  Never done    Cervical cancer screen  04/09/2020    Depression Monitoring  05/13/2022    A1C test (Diabetic or Prediabetic)  01/28/2023    Flu vaccine (1) 02/22/2024 (Originally 8/1/2022)    COVID-19 Vaccine (3 - Booster for Moderna series) 02/22/2024 (Originally 3/24/2021)    DTaP/Tdap/Td vaccine (7 - Td or Tdap) 05/13/2031    Hib vaccine  Completed    HIV screen  Completed    Hepatitis A vaccine  Aged Out    Meningococcal (ACWY) vaccine  Aged Out    Pneumococcal 0-64 years Vaccine  Aged Out    Varicella vaccine  Discontinued       Subjective:      Review of Systems   Musculoskeletal:  Positive for myalgias. Joint swelling: Pain along lower posterior left leg. Skin:  Positive for color change (cheeks for a long time now and they sting). Hair loss on sides of head--sideburn areas. No trauma     Objective:     /76   Pulse 71   Ht 5' 7\" (1.702 m)   Wt 254 lb (115.2 kg)   SpO2 99%   BMI 39.78 kg/m²   Physical Exam  Vitals and nursing note reviewed. Constitutional:       Appearance: Normal appearance. She is not ill-appearing. Cardiovascular:      Rate and Rhythm: Normal rate and regular rhythm. Heart sounds: Normal heart sounds. Musculoskeletal:        Legs:       Comments: No redness, swelling and is intact   Neurological:      Mental Status: She is alert. Assessment:       Diagnosis Orders   1. Rosacea  Azelaic Acid 15 % GEL      2. Alopecia areata  betamethasone, augmented, (DIPROLENE) 0.05 % gel      3. Tendonitis, Achilles, left  External Referral To Physical Therapy           Plan:    Sample for Antirouger's cleanser  Finacea for face  Pictures of each side of hair loss  Steroid cream for hair x 6 weeks   PT for Achilles  Return in about 6 weeks (around 4/5/2023) for recheck hair. Orders Placed This Encounter   Procedures    External Referral To Physical Therapy     Referral Priority:   Routine     Referral Type:   Eval and Treat     Referral Reason:   Specialty Services Required     Requested Specialty:   Physical Therapist     Number of Visits Requested:   1       Orders Placed This Encounter   Medications    betamethasone, augmented, (DIPROLENE) 0.05 % gel     Sig: Apply topically 2 times daily for 6 weeks     Dispense:  45 g     Refill:  0    Azelaic Acid 15 % GEL     Sig: Apply topically twice a day to face     Dispense:  45 g     Refill:  1         Patient given educational materials - see patient instructions. Discussed use, benefit, and side effects of prescribed medications. All patient questions answered. Pt voiced understanding. Reviewed health maintenance. Instructed to continue current medications, diet and exercise. Patient agreed with treatment plan. Follow up as directed.      Electronically signed by Landon Pacheco PA-C on 2/22/2023 at 5:03 PM

## 2023-03-23 ENCOUNTER — TELEPHONE (OUTPATIENT)
Dept: PRIMARY CARE CLINIC | Age: 33
End: 2023-03-23

## 2023-04-04 ENCOUNTER — E-VISIT (OUTPATIENT)
Dept: PRIMARY CARE CLINIC | Age: 33
End: 2023-04-04
Payer: COMMERCIAL

## 2023-04-04 DIAGNOSIS — J06.9 UPPER RESPIRATORY TRACT INFECTION, UNSPECIFIED TYPE: Primary | ICD-10-CM

## 2023-04-04 PROCEDURE — 99422 OL DIG E/M SVC 11-20 MIN: CPT | Performed by: PHYSICIAN ASSISTANT

## 2023-04-04 RX ORDER — AZITHROMYCIN 250 MG/1
250 TABLET, FILM COATED ORAL SEE ADMIN INSTRUCTIONS
Qty: 6 TABLET | Refills: 0 | Status: SHIPPED | OUTPATIENT
Start: 2023-04-04 | End: 2023-04-09

## 2023-04-04 ASSESSMENT — LIFESTYLE VARIABLES: SMOKING_STATUS: NO, I'VE NEVER SMOKED

## 2023-04-04 NOTE — PROGRESS NOTES
I have reviewed the questionnaire, PMH, Medications and allergies. Diagnoses and all orders for this visit:    Upper respiratory tract infection, unspecified type  -     azithromycin (ZITHROMAX) 250 MG tablet; Take 1 tablet by mouth See Admin Instructions for 5 days 500mg on day 1 followed by 250mg on days 2 - 5    Continue with supportive care and begin atb only if symptoms present over 7-10 days. Continue with supportive care and follow up with PCP if no improvement with treatment. 11-20 minutes were spent on the digital evaluation and management of this patient.

## 2023-04-21 DIAGNOSIS — G47.00 INSOMNIA, UNSPECIFIED TYPE: ICD-10-CM

## 2023-04-24 RX ORDER — TRAZODONE HYDROCHLORIDE 50 MG/1
TABLET ORAL
Qty: 30 TABLET | Refills: 5 | Status: SHIPPED | OUTPATIENT
Start: 2023-04-24

## 2023-04-24 NOTE — TELEPHONE ENCOUNTER
LAST VISIT:   4/4/2023     Future Appointments   Date Time Provider Dov Thomason   5/5/2023  8:30 AM VARUN Campbell

## 2023-05-05 ENCOUNTER — OFFICE VISIT (OUTPATIENT)
Dept: PRIMARY CARE CLINIC | Age: 33
End: 2023-05-05
Payer: COMMERCIAL

## 2023-05-05 VITALS
HEART RATE: 88 BPM | SYSTOLIC BLOOD PRESSURE: 118 MMHG | BODY MASS INDEX: 39.83 KG/M2 | WEIGHT: 253.8 LBS | HEIGHT: 67 IN | DIASTOLIC BLOOD PRESSURE: 92 MMHG | OXYGEN SATURATION: 97 %

## 2023-05-05 DIAGNOSIS — G47.09 OTHER INSOMNIA: ICD-10-CM

## 2023-05-05 DIAGNOSIS — E66.9 CLASS 2 OBESITY WITHOUT SERIOUS COMORBIDITY WITH BODY MASS INDEX (BMI) OF 35.0 TO 35.9 IN ADULT, UNSPECIFIED OBESITY TYPE: Primary | ICD-10-CM

## 2023-05-05 DIAGNOSIS — F41.9 ANXIETY: ICD-10-CM

## 2023-05-05 DIAGNOSIS — G47.00 INSOMNIA, UNSPECIFIED TYPE: ICD-10-CM

## 2023-05-05 PROCEDURE — 99214 OFFICE O/P EST MOD 30 MIN: CPT | Performed by: PHYSICIAN ASSISTANT

## 2023-05-05 RX ORDER — TIZANIDINE 4 MG/1
TABLET ORAL
COMMUNITY
Start: 2023-04-22

## 2023-05-05 RX ORDER — NALTREXONE HYDROCHLORIDE AND BUPROPION HYDROCHLORIDE 8; 90 MG/1; MG/1
2 TABLET, EXTENDED RELEASE ORAL 2 TIMES DAILY
Qty: 70 TABLET | Refills: 0 | Status: SHIPPED | OUTPATIENT
Start: 2023-05-05

## 2023-05-05 RX ORDER — MELOXICAM 15 MG/1
TABLET ORAL
COMMUNITY
Start: 2023-03-26

## 2023-05-05 RX ORDER — NALTREXONE HYDROCHLORIDE AND BUPROPION HYDROCHLORIDE 8; 90 MG/1; MG/1
2 TABLET, EXTENDED RELEASE ORAL 2 TIMES DAILY
Qty: 120 TABLET | Refills: 0 | Status: SHIPPED | OUTPATIENT
Start: 2023-05-05

## 2023-05-05 RX ORDER — TRAZODONE HYDROCHLORIDE 50 MG/1
50 TABLET ORAL NIGHTLY
Qty: 90 TABLET | Refills: 1 | Status: SHIPPED | OUTPATIENT
Start: 2023-05-05

## 2023-05-05 SDOH — ECONOMIC STABILITY: HOUSING INSECURITY
IN THE LAST 12 MONTHS, WAS THERE A TIME WHEN YOU DID NOT HAVE A STEADY PLACE TO SLEEP OR SLEPT IN A SHELTER (INCLUDING NOW)?: NO

## 2023-05-05 SDOH — ECONOMIC STABILITY: FOOD INSECURITY: WITHIN THE PAST 12 MONTHS, YOU WORRIED THAT YOUR FOOD WOULD RUN OUT BEFORE YOU GOT MONEY TO BUY MORE.: NEVER TRUE

## 2023-05-05 SDOH — ECONOMIC STABILITY: FOOD INSECURITY: WITHIN THE PAST 12 MONTHS, THE FOOD YOU BOUGHT JUST DIDN'T LAST AND YOU DIDN'T HAVE MONEY TO GET MORE.: NEVER TRUE

## 2023-05-05 SDOH — ECONOMIC STABILITY: INCOME INSECURITY: HOW HARD IS IT FOR YOU TO PAY FOR THE VERY BASICS LIKE FOOD, HOUSING, MEDICAL CARE, AND HEATING?: NOT HARD AT ALL

## 2023-05-05 ASSESSMENT — PATIENT HEALTH QUESTIONNAIRE - PHQ9
1. LITTLE INTEREST OR PLEASURE IN DOING THINGS: 1
5. POOR APPETITE OR OVEREATING: 0
3. TROUBLE FALLING OR STAYING ASLEEP: 3
2. FEELING DOWN, DEPRESSED OR HOPELESS: 1
SUM OF ALL RESPONSES TO PHQ QUESTIONS 1-9: 6
SUM OF ALL RESPONSES TO PHQ QUESTIONS 1-9: 6
10. IF YOU CHECKED OFF ANY PROBLEMS, HOW DIFFICULT HAVE THESE PROBLEMS MADE IT FOR YOU TO DO YOUR WORK, TAKE CARE OF THINGS AT HOME, OR GET ALONG WITH OTHER PEOPLE: 0
6. FEELING BAD ABOUT YOURSELF - OR THAT YOU ARE A FAILURE OR HAVE LET YOURSELF OR YOUR FAMILY DOWN: 0
8. MOVING OR SPEAKING SO SLOWLY THAT OTHER PEOPLE COULD HAVE NOTICED. OR THE OPPOSITE, BEING SO FIGETY OR RESTLESS THAT YOU HAVE BEEN MOVING AROUND A LOT MORE THAN USUAL: 0
4. FEELING TIRED OR HAVING LITTLE ENERGY: 1
SUM OF ALL RESPONSES TO PHQ9 QUESTIONS 1 & 2: 2
9. THOUGHTS THAT YOU WOULD BE BETTER OFF DEAD, OR OF HURTING YOURSELF: 0
SUM OF ALL RESPONSES TO PHQ QUESTIONS 1-9: 6
SUM OF ALL RESPONSES TO PHQ QUESTIONS 1-9: 6
7. TROUBLE CONCENTRATING ON THINGS, SUCH AS READING THE NEWSPAPER OR WATCHING TELEVISION: 0

## 2023-05-05 ASSESSMENT — ENCOUNTER SYMPTOMS
SHORTNESS OF BREATH: 0
VOMITING: 0
NAUSEA: 0
BACK PAIN: 1

## 2023-05-05 NOTE — PROGRESS NOTES
weight change (gaining). Negative for activity change, appetite change and fatigue. Respiratory:  Negative for shortness of breath. Cardiovascular:  Negative for chest pain. Gastrointestinal:  Negative for nausea and vomiting. Endocrine: Negative for cold intolerance and heat intolerance. Musculoskeletal:  Positive for back pain (on FMLA). Negative for arthralgias, myalgias and neck pain. Skin:         Alopecia on both temples   Neurological:  Negative for dizziness, seizures, syncope, weakness, light-headedness and headaches. Psychiatric/Behavioral:  Positive for sleep disturbance (when off Trazodone). Negative for agitation, behavioral problems, confusion, decreased concentration, dysphoric mood, hallucinations, self-injury and suicidal ideas. The patient is nervous/anxious. The patient is not hyperactive. Objective:     BP (!) 118/92   Pulse 88   Ht 5' 7\" (1.702 m)   Wt 253 lb 12.8 oz (115.1 kg)   SpO2 97%   BMI 39.75 kg/m²   Physical Exam    Assessment:       Diagnosis Orders   1. Class 2 obesity without serious comorbidity with body mass index (BMI) of 35.0 to 35.9 in adult, unspecified obesity type  naltrexone-buPROPion (CONTRAVE) 8-90 MG per extended release tablet    naltrexone-buPROPion (CONTRAVE) 8-90 MG per extended release tablet      2. Insomnia, unspecified type  traZODone (DESYREL) 50 MG tablet      3. Anxiety        4. Other insomnia             Plan:    REcheck BP  Refilled  Trazodone  Start contrave ---samples   Continue the Diprolene but twice a day   Try Noom   Monitor BPs  Return in about 2 months (around 7/5/2023) for Weight Management--1/2 hour. No orders of the defined types were placed in this encounter.     Orders Placed This Encounter   Medications    traZODone (DESYREL) 50 MG tablet     Sig: Take 1 tablet by mouth nightly     Dispense:  90 tablet     Refill:  1    naltrexone-buPROPion (CONTRAVE) 8-90 MG per extended release tablet     Sig: Take 2 tablets by

## 2023-05-11 RX ORDER — LISINOPRIL 10 MG/1
10 TABLET ORAL DAILY
Qty: 30 TABLET | Refills: 2 | Status: SHIPPED | OUTPATIENT
Start: 2023-05-11

## 2023-06-05 RX ORDER — LISINOPRIL 10 MG/1
10 TABLET ORAL DAILY
Qty: 30 TABLET | Refills: 5
Start: 2023-06-05

## 2023-06-19 DIAGNOSIS — Z86.2 HISTORY OF FACTOR V LEIDEN MUTATION: Primary | ICD-10-CM

## 2023-06-20 ENCOUNTER — HOSPITAL ENCOUNTER (OUTPATIENT)
Age: 33
Setting detail: SPECIMEN
Discharge: HOME | End: 2023-06-20

## 2023-06-20 ENCOUNTER — HOSPITAL ENCOUNTER (OUTPATIENT)
Facility: CLINIC | Age: 33
Discharge: HOME | End: 2023-06-20
Payer: COMMERCIAL

## 2023-06-20 ENCOUNTER — OFFICE VISIT (OUTPATIENT)
Dept: PRIMARY CARE CLINIC | Age: 33
End: 2023-06-20
Payer: COMMERCIAL

## 2023-06-20 VITALS
BODY MASS INDEX: 40.18 KG/M2 | HEART RATE: 80 BPM | DIASTOLIC BLOOD PRESSURE: 86 MMHG | OXYGEN SATURATION: 99 % | WEIGHT: 256 LBS | HEIGHT: 67 IN | SYSTOLIC BLOOD PRESSURE: 130 MMHG

## 2023-06-20 DIAGNOSIS — R10.84 GENERALIZED ABDOMINAL PAIN: ICD-10-CM

## 2023-06-20 DIAGNOSIS — K58.9 IRRITABLE BOWEL SYNDROME WITHOUT DIARRHEA: ICD-10-CM

## 2023-06-20 DIAGNOSIS — R19.5 LOOSE STOOLS: ICD-10-CM

## 2023-06-20 DIAGNOSIS — K58.9 IRRITABLE BOWEL SYNDROME WITHOUT DIARRHEA: Primary | ICD-10-CM

## 2023-06-20 DIAGNOSIS — Z86.2 HISTORY OF FACTOR V LEIDEN MUTATION: ICD-10-CM

## 2023-06-20 LAB
ALBUMIN SERPL-MCNC: 4.5 G/DL (ref 3.5–5.2)
ALBUMIN/GLOB SERPL: 1.4 {RATIO} (ref 1–2.5)
ALP SERPL-CCNC: 109 U/L (ref 35–104)
ALT SERPL-CCNC: 20 U/L (ref 5–33)
AMYLASE SERPL-CCNC: 41 U/L (ref 28–100)
AST SERPL-CCNC: 16 U/L
BASOPHILS # BLD: 0.04 K/UL (ref 0–0.2)
BASOPHILS NFR BLD: 0 % (ref 0–2)
BILIRUB DIRECT SERPL-MCNC: <0.1 MG/DL
BILIRUB INDIRECT SERPL-MCNC: ABNORMAL MG/DL (ref 0–1)
BILIRUB SERPL-MCNC: 0.2 MG/DL (ref 0.3–1.2)
EOSINOPHIL # BLD: 0.12 K/UL (ref 0–0.44)
EOSINOPHILS RELATIVE PERCENT: 1 % (ref 1–4)
ERYTHROCYTE [DISTWIDTH] IN BLOOD BY AUTOMATED COUNT: 11.9 % (ref 11.8–14.4)
HCT VFR BLD AUTO: 44.5 % (ref 36.3–47.1)
HGB BLD-MCNC: 15 G/DL (ref 11.9–15.1)
IMM GRANULOCYTES # BLD AUTO: 0.04 K/UL (ref 0–0.3)
IMM GRANULOCYTES NFR BLD: 0 %
LIPASE SERPL-CCNC: 34 U/L (ref 13–60)
LYMPHOCYTES # BLD: 31 % (ref 24–43)
LYMPHOCYTES NFR BLD: 2.86 K/UL (ref 1.1–3.7)
MCH RBC QN AUTO: 31.2 PG (ref 25.2–33.5)
MCHC RBC AUTO-ENTMCNC: 33.7 G/DL (ref 28.4–34.8)
MCV RBC AUTO: 92.5 FL (ref 82.6–102.9)
MONOCYTES NFR BLD: 0.66 K/UL (ref 0.1–1.2)
MONOCYTES NFR BLD: 7 % (ref 3–12)
NEUTROPHILS NFR BLD: 61 % (ref 36–65)
NEUTS SEG NFR BLD: 5.65 K/UL (ref 1.5–8.1)
NRBC AUTOMATED: 0 PER 100 WBC
PLATELET # BLD AUTO: 343 K/UL (ref 138–453)
PMV BLD AUTO: 10.2 FL (ref 8.1–13.5)
PROT SERPL-MCNC: 7.7 G/DL (ref 6.4–8.3)
RBC # BLD AUTO: 4.81 M/UL (ref 3.95–5.11)
WBC OTHER # BLD: 9.4 K/UL (ref 3.5–11.3)

## 2023-06-20 PROCEDURE — 80076 HEPATIC FUNCTION PANEL: CPT

## 2023-06-20 PROCEDURE — 85027 COMPLETE CBC AUTOMATED: CPT

## 2023-06-20 PROCEDURE — 99214 OFFICE O/P EST MOD 30 MIN: CPT | Performed by: PHYSICIAN ASSISTANT

## 2023-06-20 PROCEDURE — 83690 ASSAY OF LIPASE: CPT

## 2023-06-20 PROCEDURE — 36415 COLL VENOUS BLD VENIPUNCTURE: CPT

## 2023-06-20 PROCEDURE — 82150 ASSAY OF AMYLASE: CPT

## 2023-06-20 PROCEDURE — 86003 ALLG SPEC IGE CRUDE XTRC EA: CPT

## 2023-06-20 PROCEDURE — 82785 ASSAY OF IGE: CPT

## 2023-06-20 ASSESSMENT — ENCOUNTER SYMPTOMS
VOMITING: 0
ABDOMINAL PAIN: 1
NAUSEA: 1
RECTAL PAIN: 0
RESPIRATORY NEGATIVE: 1
ABDOMINAL DISTENTION: 1
BLOOD IN STOOL: 0
CONSTIPATION: 0
ANAL BLEEDING: 0
DIARRHEA: 1

## 2023-06-20 NOTE — PROGRESS NOTES
[facility]  [address]  Phone: [phone]  Fax: [phone]    Bruce Rogers is a 35 y.o. female who presents today for her medical conditions/complaintsas noted below. Chief Complaint   Patient presents with    GI Problem     Patient said every time she eats her stomach feels messed up. Patient said she never has a normal BM. Patient said its been like this \"forever \"    Other     Pt said she had labs done today          HPI:     HPI  Has been having difficulties with constipation \"forever\"     Now every time she eats no matter what she eats she has to go immediately and then has loose stool. Rarely has a formed BM. NO blood or dark stool. Has been on PPI for years. Controls her GERD, but she is dealing with nausea lately. NO hemoptysis or hematemesis    Current Outpatient Medications   Medication Sig Dispense Refill    rifAXIMin (XIFAXAN) 550 MG tablet Take 1 tablet by mouth 2 times daily 42 tablet 0    lisinopril (PRINIVIL;ZESTRIL) 10 MG tablet Take 1 tablet by mouth daily 30 tablet 5    meloxicam (MOBIC) 15 MG tablet       tiZANidine (ZANAFLEX) 4 MG tablet       traZODone (DESYREL) 50 MG tablet Take 1 tablet by mouth nightly 90 tablet 1    betamethasone, augmented, (DIPROLENE) 0.05 % gel Apply topically 2 times daily for 6 weeks 45 g 0    mupirocin (BACTROBAN NASAL) 2 % nasal ointment Take by Nasal route 2 times daily. 1 each 0    Probiotic Product (ALIGN) 4 MG CAPS Take 4 mg by mouth daily 30 capsule 0    acetaminophen (TYLENOL) 500 MG tablet Take 1 tablet by mouth every 4 hours as needed for Pain 30 tablet 0    esomeprazole Magnesium (NEXIUM) 20 MG PACK Take 1 packet by mouth daily       No current facility-administered medications for this visit.      Allergies   Allergen Reactions    Amoxicillin     Other      Food dye     Blue and green    Pcn [Penicillins]     Sulfa Antibiotics        Subjective:      Review of Systems

## 2023-06-22 ENCOUNTER — TELEPHONE (OUTPATIENT)
Dept: PRIMARY CARE CLINIC | Age: 33
End: 2023-06-22

## 2023-06-22 LAB
BARLEY IGE QN: <0.1 KU/L (ref 0–0.34)
BEEF IGE QN: <0.1 KU/L (ref 0–0.34)
CABBAGE IGE QN: <0.1 KU/L (ref 0–0.34)
CARROT IGE QN: <0.1 KU/L (ref 0–0.34)
CHICKEN MEAT IGE QN: <0.1 KU/L (ref 0–0.34)
CODFISH IGE QN: <0.1 KU/L (ref 0–0.34)
CORN IGE QN: <0.1 KU/L (ref 0–0.34)
COW MILK IGE QN: <0.1 KU/L (ref 0–0.34)
CRAB IGE QN: <0.1 KU/L (ref 0–0.34)
EGG WHITE IGE QN: <0.1 KU/L (ref 0–0.34)
GRAPE IGE QN: <0.1 KU/L (ref 0–0.34)
IGE SERPL-ACNC: 92 IU/ML
LETTUCE IGE QN: <0.1 KU/L (ref 0–0.34)
OAT IGE QN: <0.1 KU/L (ref 0–0.34)
ORANGE TREE IGE QN: <0.1 KU/L (ref 0–0.34)
PAPRIKA IGE QN: <0.1 KU/L (ref 0–0.34)
PEANUT IGE QN: <0.1 KU/L (ref 0–0.34)
PORK IGE QN: <0.1 KU/L (ref 0–0.34)
POTATO IGE QN: <0.1 KU/L (ref 0–0.34)
RICE IGE QN: <0.1 KU/L (ref 0–0.34)
RYE IGE QN: <0.1 KU/L (ref 0–0.34)
SHRIMP IGE QN: <0.1 KU/L (ref 0–0.34)
SOYBEAN IGE QN: <0.1 KU/L (ref 0–0.34)
TOMATO IGE QN: <0.1 KU/L (ref 0–0.34)
TUNA IGE QN: <0.1 KU/L (ref 0–0.34)
WHEAT IGE QN: <0.1 KU/L (ref 0–0.34)
WHITE BEAN IGE QN: <0.1 KU/L (ref 0–0.34)

## 2023-06-23 LAB
F5 P.R506Q BLD/T QL: ABNORMAL
SPECIMEN SOURCE: ABNORMAL

## 2023-07-13 ENCOUNTER — HOSPITAL ENCOUNTER (OUTPATIENT)
Age: 33
Discharge: HOME | End: 2023-07-13

## 2023-07-13 ENCOUNTER — PATIENT MESSAGE (OUTPATIENT)
Dept: PRIMARY CARE CLINIC | Age: 33
End: 2023-07-13

## 2023-07-13 PROCEDURE — 86481 TB AG RESPONSE T-CELL SUSP: CPT

## 2023-07-13 PROCEDURE — 86787 VARICELLA-ZOSTER ANTIBODY: CPT

## 2023-07-13 NOTE — TELEPHONE ENCOUNTER
Gianni Jung,      I heard the good news--can't wait to see you here as an employee! Lisinopril can cause that kind of cough. Stop it for a week and let's see if the cough resolves. Just keep an eye on your BP.

## 2023-07-14 LAB — VZV IGG SER QL IA: 0.96

## 2023-07-19 LAB — T-SPOT TB TEST: NORMAL

## 2023-08-03 ENCOUNTER — HOSPITAL ENCOUNTER (OUTPATIENT)
Facility: CLINIC | Age: 33
Discharge: HOME | End: 2023-08-05
Payer: COMMERCIAL

## 2023-08-03 ENCOUNTER — HOSPITAL ENCOUNTER (OUTPATIENT)
Dept: GENERAL RADIOLOGY | Facility: CLINIC | Age: 33
Discharge: HOME | End: 2023-08-05
Payer: COMMERCIAL

## 2023-08-03 ENCOUNTER — OFFICE VISIT (OUTPATIENT)
Dept: PRIMARY CARE CLINIC | Age: 33
End: 2023-08-03
Payer: COMMERCIAL

## 2023-08-03 VITALS
HEIGHT: 67 IN | SYSTOLIC BLOOD PRESSURE: 122 MMHG | DIASTOLIC BLOOD PRESSURE: 86 MMHG | WEIGHT: 256 LBS | HEART RATE: 77 BPM | OXYGEN SATURATION: 98 % | BODY MASS INDEX: 40.18 KG/M2

## 2023-08-03 DIAGNOSIS — M54.50 CHRONIC LEFT-SIDED LOW BACK PAIN WITHOUT SCIATICA: ICD-10-CM

## 2023-08-03 DIAGNOSIS — R19.5 LOOSE STOOLS: ICD-10-CM

## 2023-08-03 DIAGNOSIS — G89.29 CHRONIC LEFT-SIDED LOW BACK PAIN WITHOUT SCIATICA: ICD-10-CM

## 2023-08-03 DIAGNOSIS — K58.9 IRRITABLE BOWEL SYNDROME WITHOUT DIARRHEA: ICD-10-CM

## 2023-08-03 DIAGNOSIS — M79.672 LEFT FOOT PAIN: Primary | ICD-10-CM

## 2023-08-03 DIAGNOSIS — M54.50 LOW BACK PAIN RADIATING DOWN LEG: ICD-10-CM

## 2023-08-03 DIAGNOSIS — M79.606 LOW BACK PAIN RADIATING DOWN LEG: ICD-10-CM

## 2023-08-03 DIAGNOSIS — M79.672 LEFT FOOT PAIN: ICD-10-CM

## 2023-08-03 PROCEDURE — 73630 X-RAY EXAM OF FOOT: CPT

## 2023-08-03 PROCEDURE — 99213 OFFICE O/P EST LOW 20 MIN: CPT | Performed by: PHYSICIAN ASSISTANT

## 2023-08-03 RX ORDER — CELECOXIB 100 MG/1
100 CAPSULE ORAL DAILY
Qty: 60 CAPSULE | Refills: 3 | Status: SHIPPED | OUTPATIENT
Start: 2023-08-03

## 2023-08-03 RX ORDER — TIZANIDINE 4 MG/1
4 TABLET ORAL EVERY 8 HOURS PRN
Qty: 90 TABLET | Refills: 1 | Status: SHIPPED | OUTPATIENT
Start: 2023-08-03

## 2023-08-03 NOTE — PROGRESS NOTES
[facility]  [address]   Phone: [phone]  Fax: [phone]    Clement Cazares is a 35 y.o. female who presents today for her medical conditions/complaintsas noted below. Chief Complaint   Patient presents with    Foot Pain     Pt L foot is in a lot of pain. Pt states it stared a week ago. Pt states it the whole foot that is in pain and sometimes the pain will go up the calve. HPI:     Foot Pain   Pertinent negatives include no fever. No injury remembered. Burning sensation and pain in top of foot and ice made plantar area hurt. Calf stretches did help the Achilles earlier in year. Stills get some burning there after a day on her feet. Has tried icing it and taking OTC NSAID    Current Outpatient Medications   Medication Sig Dispense Refill    tiZANidine (ZANAFLEX) 4 MG tablet Take 1 tablet by mouth every 8 hours as needed (back pain) 90 tablet 1    celecoxib (CELEBREX) 100 MG capsule Take 1 capsule by mouth daily 60 capsule 3    meloxicam (MOBIC) 15 MG tablet       traZODone (DESYREL) 50 MG tablet Take 1 tablet by mouth nightly 90 tablet 1    Probiotic Product (ALIGN) 4 MG CAPS Take 4 mg by mouth daily 30 capsule 0    acetaminophen (TYLENOL) 500 MG tablet Take 1 tablet by mouth every 4 hours as needed for Pain 30 tablet 0    esomeprazole Magnesium (NEXIUM) 20 MG PACK Take 1 packet by mouth daily      lisinopril (PRINIVIL;ZESTRIL) 10 MG tablet Take 1 tablet by mouth daily (Patient not taking: Reported on 8/3/2023) 30 tablet 5     No current facility-administered medications for this visit. Allergies   Allergen Reactions    Amoxicillin     Other      Food dye     Blue and green    Pcn [Penicillins]     Sulfa Antibiotics        Subjective:      Review of Systems   Constitutional:  Negative for chills, diaphoresis and fever.      Objective:     /86   Pulse 77   Ht 5' 7\" (1.702 m)   Wt 256 lb (116.1 kg)   SpO2 98%   BMI

## 2023-08-07 LAB
CHOLEST SERPL-MCNC: 189 MG/DL
CHOLESTEROL/HDL RATIO: 4.4
GLUCOSE SERPL-MCNC: 101 MG/DL (ref 70–99)
HDLC SERPL-MCNC: 43 MG/DL
LDLC SERPL CALC-MCNC: 112 MG/DL (ref 0–130)
PATIENT FASTING?: YES
TRIGL SERPL-MCNC: 172 MG/DL

## 2023-08-18 ENCOUNTER — TELEPHONE (OUTPATIENT)
Dept: PRIMARY CARE CLINIC | Age: 33
End: 2023-08-18

## 2023-08-18 DIAGNOSIS — G47.00 INSOMNIA, UNSPECIFIED TYPE: ICD-10-CM

## 2023-08-18 RX ORDER — TRAZODONE HYDROCHLORIDE 50 MG/1
50 TABLET ORAL NIGHTLY
Qty: 90 TABLET | Refills: 1 | Status: SHIPPED | OUTPATIENT
Start: 2023-08-18 | End: 2023-08-24 | Stop reason: DRUGHIGH

## 2023-08-18 NOTE — TELEPHONE ENCOUNTER
PA sent to plan via [employer] for trazodone. Response states med not covered.  Pt states pharmacy has an old insurance card

## 2023-08-24 ENCOUNTER — OFFICE VISIT (OUTPATIENT)
Dept: PRIMARY CARE CLINIC | Age: 33
End: 2023-08-24
Payer: COMMERCIAL

## 2023-08-24 VITALS
BODY MASS INDEX: 41.12 KG/M2 | DIASTOLIC BLOOD PRESSURE: 84 MMHG | HEART RATE: 82 BPM | WEIGHT: 262 LBS | OXYGEN SATURATION: 97 % | SYSTOLIC BLOOD PRESSURE: 120 MMHG | HEIGHT: 67 IN

## 2023-08-24 DIAGNOSIS — G47.00 INSOMNIA, UNSPECIFIED TYPE: ICD-10-CM

## 2023-08-24 DIAGNOSIS — G47.09 OTHER INSOMNIA: ICD-10-CM

## 2023-08-24 DIAGNOSIS — E66.01 CLASS 3 SEVERE OBESITY DUE TO EXCESS CALORIES WITHOUT SERIOUS COMORBIDITY WITH BODY MASS INDEX (BMI) OF 40.0 TO 44.9 IN ADULT (HCC): ICD-10-CM

## 2023-08-24 DIAGNOSIS — K58.0 IRRITABLE BOWEL SYNDROME WITH DIARRHEA: ICD-10-CM

## 2023-08-24 DIAGNOSIS — R73.01 IMPAIRED FASTING GLUCOSE: Primary | ICD-10-CM

## 2023-08-24 PROBLEM — E66.813 CLASS 3 SEVERE OBESITY DUE TO EXCESS CALORIES WITHOUT SERIOUS COMORBIDITY IN ADULT: Status: ACTIVE | Noted: 2017-08-14

## 2023-08-24 LAB — HBA1C MFR BLD: 5.4 %

## 2023-08-24 PROCEDURE — 83036 HEMOGLOBIN GLYCOSYLATED A1C: CPT | Performed by: PHYSICIAN ASSISTANT

## 2023-08-24 PROCEDURE — 99213 OFFICE O/P EST LOW 20 MIN: CPT | Performed by: PHYSICIAN ASSISTANT

## 2023-08-24 RX ORDER — TRAZODONE HYDROCHLORIDE 100 MG/1
100 TABLET ORAL NIGHTLY
Qty: 90 TABLET | Refills: 0 | Status: SHIPPED | OUTPATIENT
Start: 2023-08-24

## 2023-08-24 ASSESSMENT — ENCOUNTER SYMPTOMS
RESPIRATORY NEGATIVE: 1
BACK PAIN: 0

## 2023-08-24 NOTE — PROGRESS NOTES
[id number] PRIMARY CARE  [address] [address]  Dept: Juan Arias is a 35 y.o. female who presents today for her medical conditions/complaints as noted below. Chief Complaint   Patient presents with    Blood Work     Pt is here to go over blood work. Pt states sugar was high on the blood work       HPI:     HPI  [facility] labs show impaired fasting glucose and slightly high triglycerides   Gained 6 pounds since 8/3/23  Diet : hungry a lot ---snacking 1 hour after eating. Is trying to walk more    Still having trouble sleeping thru night.   On Trazodone 50mg     Still has not heard from GI   LDL Cholesterol (mg/dL)   Date Value   08/07/2023 112   11/04/2017 102       (goal LDL is <100)   AST (U/L)   Date Value   06/20/2023 16     ALT (U/L)   Date Value   06/20/2023 20     BUN (mg/dL)   Date Value   01/28/2022 10     BP Readings from Last 3 Encounters:   08/24/23 120/84   08/03/23 122/86   06/20/23 130/86          (goal 120/80)    Past Medical History:   Diagnosis Date    Factor V Leiden mutation ([address])     H/O concussion     H/O contact dermatitis     H/O irritable bowel syndrome     Pain of anterior chest wall with respiration       Past Surgical History:   Procedure Laterality Date    CHOLECYSTECTOMY      PARTIAL HYMENECTOMY  01/01/2008    UPPER GASTROINTESTINAL ENDOSCOPY  2004       Family History   Problem Relation Age of Onset    Heart Disease Father     Heart Disease Maternal Grandmother     Diabetes Maternal Grandfather     Heart Disease Maternal Grandfather     Cancer Paternal Grandmother         breast    Heart Disease Paternal Grandmother     Heart Disease Paternal Grandfather     Cancer Paternal Grandfather         prostate       Social History     Tobacco Use    Smoking status: Never    Smokeless tobacco: Never   Substance Use Topics    Alcohol use: Not Currently     Comment: Socially      Current Outpatient Medications

## 2023-08-30 ENCOUNTER — HOSPITAL ENCOUNTER (OUTPATIENT)
Age: 33
Setting detail: SPECIMEN
Discharge: HOME | End: 2023-08-30

## 2023-08-30 ENCOUNTER — OFFICE VISIT (OUTPATIENT)
Dept: GASTROENTEROLOGY | Age: 33
End: 2023-08-30
Payer: COMMERCIAL

## 2023-08-30 ENCOUNTER — TELEPHONE (OUTPATIENT)
Dept: FAMILY MEDICINE CLINIC | Age: 33
End: 2023-08-30

## 2023-08-30 VITALS
WEIGHT: 266.2 LBS | DIASTOLIC BLOOD PRESSURE: 94 MMHG | SYSTOLIC BLOOD PRESSURE: 138 MMHG | BODY MASS INDEX: 41.78 KG/M2 | HEIGHT: 67 IN

## 2023-08-30 DIAGNOSIS — R19.7 DIARRHEA, UNSPECIFIED TYPE: ICD-10-CM

## 2023-08-30 DIAGNOSIS — K58.9 IRRITABLE BOWEL SYNDROME WITHOUT DIARRHEA: ICD-10-CM

## 2023-08-30 DIAGNOSIS — R19.7 DIARRHEA, UNSPECIFIED TYPE: Primary | ICD-10-CM

## 2023-08-30 LAB — ERYTHROCYTE [SEDIMENTATION RATE] IN BLOOD BY PHOTOMETRIC METHOD: 7 MM/HR (ref 0–20)

## 2023-08-30 PROCEDURE — 99203 OFFICE O/P NEW LOW 30 MIN: CPT | Performed by: INTERNAL MEDICINE

## 2023-08-30 RX ORDER — DICYCLOMINE HYDROCHLORIDE 10 MG/1
10 CAPSULE ORAL 4 TIMES DAILY PRN
Qty: 360 CAPSULE | Refills: 1 | Status: SHIPPED | OUTPATIENT
Start: 2023-08-30

## 2023-08-30 ASSESSMENT — ENCOUNTER SYMPTOMS
COUGH: 0
RECTAL PAIN: 0
VOICE CHANGE: 0
TROUBLE SWALLOWING: 0
ANAL BLEEDING: 0
DIARRHEA: 1
BLOOD IN STOOL: 0
EYES NEGATIVE: 1
SHORTNESS OF BREATH: 0
CHOKING: 0
CONSTIPATION: 0
RESPIRATORY NEGATIVE: 1
NAUSEA: 0
ABDOMINAL PAIN: 1
WHEEZING: 0
ABDOMINAL DISTENTION: 1
VOMITING: 0

## 2023-08-30 NOTE — PROGRESS NOTES
Reason for Referral:  Loose Stool      Narcisa Fischer PA-C  [address],  [address]    Chief Complaint   Patient presents with    New Patient     Loose stools  Irritable bowel syndrome without diarrhea           HISTORY OF PRESENT ILLNESS: Germain Pérez is a 35 y.o. female with a past history remarkable for prior history of cholecystectomy, prior colonoscopy approximately 10 years ago for bleeding, family history of GERD, referred for evaluation of irregular bowel symptoms with intermittent diarrhea concern for possible IBS-D. The patient denies any obvious dietary triggers. No changes in weight. No blood in stool. No family history of inflammatory bowel disease. Patient does report some intermittent urgency without any abdominal cramping. No recent endoscopic evaluation. Smoker: n  Drinking history: social   Illicit drugs: n   Abdominal surgeries: CCY   Prior Colonoscopy: 10 yrs ago, bleeding--hemorrhoids   Prior EGD: none   FH of GI issues: Mother- GERD, refractory       Past Medical,Family, and Social History reviewed and does contribute to the patient presentingcondition. Patient's PMH/PSH,SH,PSYCH Hx, MEDs, ALLERGIES, and ROS were all reviewed and updated in the appropriate sections.     PAST MEDICAL HISTORY:  Past Medical History:   Diagnosis Date    Factor V Leiden mutation ([address] [facility])     H/O concussion     H/O contact dermatitis     H/O irritable bowel syndrome     Pain of anterior chest wall with respiration        Past Surgical History:   Procedure Laterality Date    CHOLECYSTECTOMY      PARTIAL HYMENECTOMY  01/01/2008    UPPER GASTROINTESTINAL ENDOSCOPY  2004       CURRENT MEDICATIONS:    Current Outpatient Medications:     dicyclomine (BENTYL) 10 MG capsule, Take 1 capsule by mouth 4 times daily as needed (Diarrhea), Disp: 360 capsule, Rfl: 1    traZODone (DESYREL) 100 MG tablet, Take 1 tablet by mouth nightly, Disp: 90 tablet, Rfl: 0    tiZANidine

## 2023-08-31 ENCOUNTER — HOSPITAL ENCOUNTER (OUTPATIENT)
Age: 33
Setting detail: SPECIMEN
Discharge: HOME | End: 2023-08-31

## 2023-08-31 DIAGNOSIS — R19.7 DIARRHEA, UNSPECIFIED TYPE: ICD-10-CM

## 2023-08-31 DIAGNOSIS — K58.9 IRRITABLE BOWEL SYNDROME WITHOUT DIARRHEA: ICD-10-CM

## 2023-08-31 LAB
GLIADIN IGA SER IA-ACNC: 1.6 U/ML
GLIADIN IGG SER IA-ACNC: <0.4 U/ML
IGA SERPL-MCNC: 238 MG/DL (ref 70–400)
TTG IGA SER IA-ACNC: 0.6 U/ML

## 2023-09-02 LAB
CALPROTECTIN, FECAL: 64 UG/G
FECAL PANCREATIC ELASTASE-1: >800 UG/G

## 2023-09-07 LAB — IBD PANEL: NORMAL

## 2023-09-22 ENCOUNTER — OFFICE VISIT (OUTPATIENT)
Dept: PRIMARY CARE CLINIC | Age: 33
End: 2023-09-22
Payer: COMMERCIAL

## 2023-09-22 VITALS
BODY MASS INDEX: 41.06 KG/M2 | HEART RATE: 81 BPM | DIASTOLIC BLOOD PRESSURE: 88 MMHG | HEIGHT: 67 IN | OXYGEN SATURATION: 98 % | SYSTOLIC BLOOD PRESSURE: 126 MMHG | WEIGHT: 261.6 LBS

## 2023-09-22 DIAGNOSIS — G47.19 EXCESSIVE DAYTIME SLEEPINESS: Primary | ICD-10-CM

## 2023-09-22 DIAGNOSIS — E66.9 OBESITY WITHOUT SERIOUS COMORBIDITY, UNSPECIFIED CLASSIFICATION, UNSPECIFIED OBESITY TYPE: ICD-10-CM

## 2023-09-22 DIAGNOSIS — R06.83 SNORING: ICD-10-CM

## 2023-09-22 PROCEDURE — 99213 OFFICE O/P EST LOW 20 MIN: CPT | Performed by: PHYSICIAN ASSISTANT

## 2023-09-22 RX ORDER — PHENTERMINE HYDROCHLORIDE 37.5 MG/1
37.5 CAPSULE ORAL EVERY MORNING
Qty: 30 CAPSULE | Refills: 0 | Status: SHIPPED | OUTPATIENT
Start: 2023-09-22 | End: 2023-10-22

## 2023-09-22 ASSESSMENT — ENCOUNTER SYMPTOMS: BACK PAIN: 0

## 2023-09-22 NOTE — TELEPHONE ENCOUNTER
Pt states medication was supposed to be sent in during visit today. Pt would like it to go to [facility]. Med pended.

## 2023-09-22 NOTE — PROGRESS NOTES
[address] PRIMARY CARE  [address] [address]  Dept: [facility] Flo Arias is a 35 y.o. female who presents today for her medical conditions/complaints as noted below. Chief Complaint   Patient presents with    Weight Management     Pt is here for weight management. Pt states she has been eating better and walking everyday with no success. Pt would still like medication options for weight loss     Other     Pt does not want flu vaccine today        HPI:     Weight Management  Pertinent negatives include no arthralgias or myalgias.      Exercise: walking  Diet: healthier choices  Lost 4 pounds   Never had sleep study but is sleeping better with Trazodone        LDL Cholesterol (mg/dL)   Date Value   08/07/2023 112   11/04/2017 102       (goal LDL is <100)   AST (U/L)   Date Value   06/20/2023 16     ALT (U/L)   Date Value   06/20/2023 20     BUN (mg/dL)   Date Value   01/28/2022 10     BP Readings from Last 3 Encounters:   09/22/23 126/88   08/30/23 (!) 138/94   08/24/23 120/84          (goal 120/80)    Past Medical History:   Diagnosis Date    Factor V Leiden mutation ([address])     H/O concussion     H/O contact dermatitis     H/O irritable bowel syndrome     Pain of anterior chest wall with respiration       Past Surgical History:   Procedure Laterality Date    CHOLECYSTECTOMY      PARTIAL HYMENECTOMY  01/01/2008    UPPER GASTROINTESTINAL ENDOSCOPY  2004       Family History   Problem Relation Age of Onset    Heart Disease Father     Heart Disease Maternal Grandmother     Diabetes Maternal Grandfather     Heart Disease Maternal Grandfather     Cancer Paternal Grandmother         breast    Heart Disease Paternal Grandmother     Heart Disease Paternal Grandfather     Cancer Paternal Grandfather         prostate       Social History     Tobacco Use    Smoking status: Never    Smokeless tobacco: Never   Substance Use Topics    Alcohol use: Not

## 2023-10-04 DIAGNOSIS — G47.00 INSOMNIA, UNSPECIFIED TYPE: ICD-10-CM

## 2023-10-04 RX ORDER — TRAZODONE HYDROCHLORIDE 100 MG/1
100 TABLET ORAL NIGHTLY
Qty: 90 TABLET | Refills: 1 | Status: SHIPPED | OUTPATIENT
Start: 2023-10-04

## 2023-10-04 NOTE — TELEPHONE ENCOUNTER
LAST VISIT:   9/22/2023     Future Appointments   Date Time Provider [address]  [address]   10/27/2023  7:30 AM VARUN Blanc

## 2023-10-16 DIAGNOSIS — R06.83 SNORING: ICD-10-CM

## 2023-10-16 DIAGNOSIS — E66.9 OBESITY WITHOUT SERIOUS COMORBIDITY, UNSPECIFIED CLASSIFICATION, UNSPECIFIED OBESITY TYPE: ICD-10-CM

## 2023-10-16 RX ORDER — PHENTERMINE HYDROCHLORIDE 37.5 MG/1
37.5 CAPSULE ORAL EVERY MORNING
Qty: 30 CAPSULE | Refills: 0 | Status: SHIPPED | OUTPATIENT
Start: 2023-10-16 | End: 2023-11-15

## 2023-10-16 NOTE — TELEPHONE ENCOUNTER
LAST VISIT:   Visit date not found     Future Appointments   Date Time Provider [address]  [address]   10/27/2023  7:30 AM VARUN Barfield

## 2023-10-27 ENCOUNTER — OFFICE VISIT (OUTPATIENT)
Dept: PRIMARY CARE CLINIC | Age: 33
End: 2023-10-27

## 2023-10-27 VITALS
WEIGHT: 256 LBS | DIASTOLIC BLOOD PRESSURE: 80 MMHG | HEIGHT: 67 IN | BODY MASS INDEX: 40.18 KG/M2 | OXYGEN SATURATION: 97 % | HEART RATE: 96 BPM | SYSTOLIC BLOOD PRESSURE: 132 MMHG

## 2023-10-27 DIAGNOSIS — G47.09 OTHER INSOMNIA: ICD-10-CM

## 2023-10-27 DIAGNOSIS — L91.8 INFLAMED SKIN TAG: ICD-10-CM

## 2023-10-27 DIAGNOSIS — Z79.899 MEDICATION MANAGEMENT: Primary | ICD-10-CM

## 2023-10-27 DIAGNOSIS — E66.01 CLASS 3 SEVERE OBESITY DUE TO EXCESS CALORIES WITHOUT SERIOUS COMORBIDITY WITH BODY MASS INDEX (BMI) OF 40.0 TO 44.9 IN ADULT (HCC): ICD-10-CM

## 2023-10-27 ASSESSMENT — ENCOUNTER SYMPTOMS: BACK PAIN: 0

## 2023-10-27 NOTE — PROGRESS NOTES
[address] PRIMARY CARE  [address] [address] [address]  Dept: [address] Flo Arias is a 35 y.o. female who presents today for her medical conditions/complaints as noted below. Chief Complaint   Patient presents with    Weight Management     1 month f/u for phentermine. Patient has no complaints        HPI:     HPI  Taking phentermine and lost 5 pounds  No SEs  Diet: healthier choices  Exercise: walking 2 x a week. Has not scheduled sleep study  Trazodone is not working as well. LDL Cholesterol (mg/dL)   Date Value   08/07/2023 112   11/04/2017 102       (goal LDL is <100)   AST (U/L)   Date Value   06/20/2023 16     ALT (U/L)   Date Value   06/20/2023 20     BUN (mg/dL)   Date Value   01/28/2022 10     BP Readings from Last 3 Encounters:   10/27/23 132/80   09/22/23 126/88   08/30/23 (!) 138/94          (goal 120/80)    Past Medical History:   Diagnosis Date    Factor V Leiden mutation ([address])     H/O concussion     H/O contact dermatitis     H/O irritable bowel syndrome     Pain of anterior chest wall with respiration       Past Surgical History:   Procedure Laterality Date    CHOLECYSTECTOMY      PARTIAL HYMENECTOMY  01/01/2008    UPPER GASTROINTESTINAL ENDOSCOPY  2004       Family History   Problem Relation Age of Onset    Heart Disease Father     Heart Disease Maternal Grandmother     Diabetes Maternal Grandfather     Heart Disease Maternal Grandfather     Cancer Paternal Grandmother         breast    Heart Disease Paternal Grandmother     Heart Disease Paternal Grandfather     Cancer Paternal Grandfather         prostate       Social History     Tobacco Use    Smoking status: Never    Smokeless tobacco: Never   Substance Use Topics    Alcohol use: Not Currently     Comment: Socially      Current Outpatient Medications   Medication Sig Dispense Refill    phentermine 37.5 MG capsule Take 1 capsule by mouth every morning for 30 days.  Max

## 2023-10-27 NOTE — PROGRESS NOTES
[facility]  [address]   Phone: [phone]  Fax: [phone]    Duane Moralez is a 35 y.o. female who presents today for Skin Tag Removal.      After Cleaning with alcohol, # 0 skin tags were anesthestized with 2% Lidocaine W/Epi and 1 was not anesthestized. All skin tags snipped with Iris Scissors. ACl used for hemostasis. Wide based skin tags were removed with a Derma Blade. Plan:  Keep area(s) cleansed twice daily and apply Neosporin or Vaseline petroleum jelly twice daily for 5 days,      Diagnosis Orders   1. Medication management        2. Class 3 severe obesity due to excess calories without serious comorbidity with body mass index (BMI) of 40.0 to 44.9 in adult (HCC)        3. Other insomnia        4. Inflamed skin tag  GA REMOVAL SK TGS MLT FIBRQ TAGS ANY AREA EA 10        Return for Well woman and 1 month for phentermine.     Electronically signed by Alexi Smith PA-C on 10/27/23 at 7:46 AM EDT

## 2023-11-14 ENCOUNTER — HOSPITAL ENCOUNTER (OUTPATIENT)
Dept: SLEEP CENTER | Age: 33
Discharge: HOME | End: 2023-11-16
Payer: COMMERCIAL

## 2023-11-14 VITALS — WEIGHT: 250 LBS | BODY MASS INDEX: 39.24 KG/M2 | HEIGHT: 67 IN

## 2023-11-14 DIAGNOSIS — E66.9 OBESITY WITHOUT SERIOUS COMORBIDITY, UNSPECIFIED CLASSIFICATION, UNSPECIFIED OBESITY TYPE: ICD-10-CM

## 2023-11-14 DIAGNOSIS — G47.19 EXCESSIVE DAYTIME SLEEPINESS: ICD-10-CM

## 2023-11-14 DIAGNOSIS — R06.83 SNORING: ICD-10-CM

## 2023-11-14 PROCEDURE — 95810 POLYSOM 6/> YRS 4/> PARAM: CPT

## 2023-11-15 DIAGNOSIS — R06.83 SNORING: ICD-10-CM

## 2023-11-15 DIAGNOSIS — E66.9 OBESITY WITHOUT SERIOUS COMORBIDITY, UNSPECIFIED CLASSIFICATION, UNSPECIFIED OBESITY TYPE: ICD-10-CM

## 2023-11-16 RX ORDER — PHENTERMINE HYDROCHLORIDE 37.5 MG/1
37.5 CAPSULE ORAL EVERY MORNING
Qty: 30 CAPSULE | Refills: 0 | Status: SHIPPED | OUTPATIENT
Start: 2023-11-16 | End: 2023-12-16

## 2023-11-16 NOTE — TELEPHONE ENCOUNTER
Future Appointments   Date Time Provider [address]  [address]   11/27/2023  8:00 AM VARUN Ponce

## 2023-11-23 LAB — STATUS: NORMAL

## 2023-12-11 DIAGNOSIS — G47.00 INSOMNIA, UNSPECIFIED TYPE: ICD-10-CM

## 2023-12-11 RX ORDER — TRAZODONE HYDROCHLORIDE 50 MG/1
50 TABLET ORAL NIGHTLY
Qty: 30 TABLET | Refills: 5
Start: 2023-12-11

## 2023-12-18 DIAGNOSIS — J06.9 UPPER RESPIRATORY TRACT INFECTION, UNSPECIFIED TYPE: ICD-10-CM

## 2023-12-18 RX ORDER — AZITHROMYCIN 250 MG/1
250 TABLET, FILM COATED ORAL SEE ADMIN INSTRUCTIONS
Qty: 6 TABLET | Refills: 0 | OUTPATIENT
Start: 2023-12-18 | End: 2023-12-23

## 2023-12-18 NOTE — TELEPHONE ENCOUNTER
LAST VISIT:   10/27/2023     Future Appointments   Date Time Provider Department Center   1/15/2024 10:30 AM [facility] Sy[facility]

## 2024-01-08 ENCOUNTER — TELEPHONE (OUTPATIENT)
Dept: GASTROENTEROLOGY | Age: 34
End: 2024-01-08

## 2024-01-08 NOTE — TELEPHONE ENCOUNTER
Patient would like to schedule with  when he comes to [place] for irritable bowel syndrome/diarrhea.

## 2024-01-15 ENCOUNTER — TELEPHONE (OUTPATIENT)
Dept: PRIMARY CARE CLINIC | Age: 34
End: 2024-01-15

## 2024-01-15 ENCOUNTER — OFFICE VISIT (OUTPATIENT)
Dept: PRIMARY CARE CLINIC | Age: 34
End: 2024-01-15
Payer: COMMERCIAL

## 2024-01-15 VITALS
OXYGEN SATURATION: 97 % | DIASTOLIC BLOOD PRESSURE: 82 MMHG | WEIGHT: 265 LBS | HEIGHT: 67 IN | HEART RATE: 90 BPM | SYSTOLIC BLOOD PRESSURE: 122 MMHG | BODY MASS INDEX: 41.59 KG/M2

## 2024-01-15 DIAGNOSIS — N92.6 IRREGULAR MENSTRUAL BLEEDING: ICD-10-CM

## 2024-01-15 DIAGNOSIS — Z79.899 MEDICATION MANAGEMENT: Primary | ICD-10-CM

## 2024-01-15 DIAGNOSIS — E66.01 CLASS 3 SEVERE OBESITY DUE TO EXCESS CALORIES WITHOUT SERIOUS COMORBIDITY WITH BODY MASS INDEX (BMI) OF 40.0 TO 44.9 IN ADULT (HCC): ICD-10-CM

## 2024-01-15 LAB — TSH SERPL DL<=0.05 MIU/L-ACNC: 3.16 UIU/ML (ref 0.3–5)

## 2024-01-15 PROCEDURE — 99214 OFFICE O/P EST MOD 30 MIN: CPT | Performed by: PHYSICIAN ASSISTANT

## 2024-01-15 RX ORDER — SEMAGLUTIDE 0.25 MG/.5ML
0.25 INJECTION, SOLUTION SUBCUTANEOUS
Qty: 2 ML | Refills: 0 | Status: SHIPPED | OUTPATIENT
Start: 2024-01-15

## 2024-01-15 ASSESSMENT — PATIENT HEALTH QUESTIONNAIRE - PHQ9
SUM OF ALL RESPONSES TO PHQ QUESTIONS 1-9: 5
SUM OF ALL RESPONSES TO PHQ9 QUESTIONS 1 & 2: 0
2. FEELING DOWN, DEPRESSED OR HOPELESS: 0
8. MOVING OR SPEAKING SO SLOWLY THAT OTHER PEOPLE COULD HAVE NOTICED. OR THE OPPOSITE, BEING SO FIGETY OR RESTLESS THAT YOU HAVE BEEN MOVING AROUND A LOT MORE THAN USUAL: 0
10. IF YOU CHECKED OFF ANY PROBLEMS, HOW DIFFICULT HAVE THESE PROBLEMS MADE IT FOR YOU TO DO YOUR WORK, TAKE CARE OF THINGS AT HOME, OR GET ALONG WITH OTHER PEOPLE: 0
7. TROUBLE CONCENTRATING ON THINGS, SUCH AS READING THE NEWSPAPER OR WATCHING TELEVISION: 1
SUM OF ALL RESPONSES TO PHQ QUESTIONS 1-9: 5
SUM OF ALL RESPONSES TO PHQ QUESTIONS 1-9: 5
1. LITTLE INTEREST OR PLEASURE IN DOING THINGS: 0
4. FEELING TIRED OR HAVING LITTLE ENERGY: 2
3. TROUBLE FALLING OR STAYING ASLEEP: 0
6. FEELING BAD ABOUT YOURSELF - OR THAT YOU ARE A FAILURE OR HAVE LET YOURSELF OR YOUR FAMILY DOWN: 0
SUM OF ALL RESPONSES TO PHQ QUESTIONS 1-9: 5
9. THOUGHTS THAT YOU WOULD BE BETTER OFF DEAD, OR OF HURTING YOURSELF: 0
5. POOR APPETITE OR OVEREATING: 2

## 2024-01-15 ASSESSMENT — ENCOUNTER SYMPTOMS: BACK PAIN: 0

## 2024-01-15 NOTE — TELEPHONE ENCOUNTER
Pt called after speaking with her pharmacy and there is a back order on the Wegovy. Pt would like to know if there is anything else that can be prescribed for her. She also stated that a Prior Authorization is going to have be done, per her pharmacist.     Please advise.

## 2024-01-15 NOTE — PROGRESS NOTES
[facility]  [facility]  [address] [facility] [address]  Dept: [phone]    Priti Daly is a 33 y.o. female who presents today for her medical conditions/complaints as noted below.      Chief Complaint   Patient presents with    Weight Management     Pt is here for weight management -- pt is no longer taking phentermine , wants to discuss other options        HPI:     Weight Management  Pertinent negatives include no arthralgias or myalgias.     Stopped phentermine because it was not working.   On breaks she is walking   Tracking calories off and on  Eats when bored or stressed    Had Mirena changed recently ---has period every 3 weeks now.  This did not happen when she originally had it put in.       Has hard bumps on scalp.   LDL Cholesterol (mg/dL)   Date Value   08/07/2023 112   11/04/2017 102       (goal LDL is <100)   AST (U/L)   Date Value   06/20/2023 16     ALT (U/L)   Date Value   06/20/2023 20     BUN (mg/dL)   Date Value   01/28/2022 10     BP Readings from Last 3 Encounters:   01/15/24 122/82   10/27/23 132/80   09/22/23 126/88          (goal 120/80)    Past Medical History:   Diagnosis Date    Factor V Leiden mutation (HCC)     H/O concussion     H/O contact dermatitis     H/O irritable bowel syndrome     Pain of anterior chest wall with respiration       Past Surgical History:   Procedure Laterality Date    CHOLECYSTECTOMY      PARTIAL HYMENECTOMY  01/01/2008    UPPER GASTROINTESTINAL ENDOSCOPY  2004       Family History   Problem Relation Age of Onset    Heart Disease Father     Heart Disease Maternal Grandmother     Diabetes Maternal Grandfather     Heart Disease Maternal Grandfather     Cancer Paternal Grandmother         breast    Heart Disease Paternal Grandmother     Heart Disease Paternal Grandfather     Cancer Paternal Grandfather         prostate       Social History     Tobacco Use    Smoking status: Never    Smokeless tobacco:

## 2024-01-16 ENCOUNTER — TELEPHONE (OUTPATIENT)
Dept: PRIMARY CARE CLINIC | Age: 34
End: 2024-01-16

## 2024-01-22 ENCOUNTER — PATIENT MESSAGE (OUTPATIENT)
Dept: PRIMARY CARE CLINIC | Age: 34
End: 2024-01-22

## 2024-02-12 ENCOUNTER — OFFICE VISIT (OUTPATIENT)
Dept: GASTROENTEROLOGY | Age: 34
End: 2024-02-12
Payer: COMMERCIAL

## 2024-02-12 VITALS
BODY MASS INDEX: 41.5 KG/M2 | SYSTOLIC BLOOD PRESSURE: 138 MMHG | DIASTOLIC BLOOD PRESSURE: 90 MMHG | WEIGHT: 265 LBS | TEMPERATURE: 97.5 F

## 2024-02-12 DIAGNOSIS — K58.0 IRRITABLE BOWEL SYNDROME WITH DIARRHEA: ICD-10-CM

## 2024-02-12 DIAGNOSIS — F41.9 ANXIETY: ICD-10-CM

## 2024-02-12 DIAGNOSIS — K21.9 GASTROESOPHAGEAL REFLUX DISEASE, UNSPECIFIED WHETHER ESOPHAGITIS PRESENT: Primary | ICD-10-CM

## 2024-02-12 DIAGNOSIS — E66.8 MODERATE OBESITY: ICD-10-CM

## 2024-02-12 PROCEDURE — 99214 OFFICE O/P EST MOD 30 MIN: CPT | Performed by: INTERNAL MEDICINE

## 2024-02-12 NOTE — PROGRESS NOTES
GI CLINIC FOLLOW UP    NTERVAL HISTORY:   No referring provider defined for this encounter.    Chief Complaint   Patient presents with    Irritable Bowel Syndrome     Pt is here today for a f/u previous Dr LEIDA Virk pt last seen on 8/30/23 for IBS, GERD, diarrhea.       1. Gastroesophageal reflux disease, unspecified whether esophagitis present    2. Irritable bowel syndrome with diarrhea    3. Anxiety    4. Moderate obesity      This patient seen my office for the first time  In the past has been followed by Dr. Alexey Virk  She has been evaluated with history for what appears to be mostly predominant irritable bowel syndrome with diarrhea  Patient has been complaining of some abdominal pains, off and on cramping  Also complains of abdominal bloating and gas  Has off and on nausea without any sig vomiting  She has moderate obesity  Has history for chronic GERD  Has no weight loss  Has some anxiety issues  She has history for GERD has been taking proton pump inhibitor therapy  She colonoscopy done in 2014 at this point I do not have any records available for that  Time complains some mucus coming out through the stools no rectal bleeding  Never had an upper endoscopy done in the past  Denies smoking alcohol abuse illicit drug usage    HISTORY OF PRESENT ILLNESS: Ms.Jillian Daly is a 33 y.o. female with a past history remarkable for , referred for evaluation of   Chief Complaint   Patient presents with    Irritable Bowel Syndrome     Pt is here today for a f/u previous Dr LEIDA Virk pt last seen on 8/30/23 for IBS, GERD, diarrhea.   .    Past Medical,Family, and Social History reviewed and does contribute to the patient presenting condition.    Patient's PMH/PSH,SH,PSYCH Hx, MEDs, ALLERGIES, and ROS were all reviewed and updated in the appropriate sections.    PAST MEDICAL HISTORY:  Past Medical History:   Diagnosis Date    Factor V Leiden mutation (HCC)     H/O concussion     H/O contact dermatitis

## 2024-02-14 ENCOUNTER — ANESTHESIA (OUTPATIENT)
Dept: OPERATING ROOM | Age: 34
End: 2024-02-14
Payer: COMMERCIAL

## 2024-02-14 ENCOUNTER — ANESTHESIA EVENT (OUTPATIENT)
Dept: OPERATING ROOM | Age: 34
End: 2024-02-14
Payer: COMMERCIAL

## 2024-02-14 ENCOUNTER — HOSPITAL ENCOUNTER (OUTPATIENT)
Age: 34
Setting detail: OUTPATIENT SURGERY
Discharge: HOME | End: 2024-02-14
Attending: INTERNAL MEDICINE | Admitting: INTERNAL MEDICINE
Payer: COMMERCIAL

## 2024-02-14 VITALS
DIASTOLIC BLOOD PRESSURE: 84 MMHG | BODY MASS INDEX: 41.12 KG/M2 | TEMPERATURE: 97 F | OXYGEN SATURATION: 99 % | WEIGHT: 262 LBS | SYSTOLIC BLOOD PRESSURE: 130 MMHG | HEIGHT: 67 IN | HEART RATE: 72 BPM | RESPIRATION RATE: 15 BRPM

## 2024-02-14 DIAGNOSIS — K58.0 IRRITABLE BOWEL SYNDROME WITH DIARRHEA: ICD-10-CM

## 2024-02-14 DIAGNOSIS — K21.9 GASTROESOPHAGEAL REFLUX DISEASE, UNSPECIFIED WHETHER ESOPHAGITIS PRESENT: ICD-10-CM

## 2024-02-14 LAB — HCG SERPL QL: NEGATIVE

## 2024-02-14 PROCEDURE — 3700000000 HC ANESTHESIA ATTENDED CARE: Performed by: INTERNAL MEDICINE

## 2024-02-14 PROCEDURE — 2500000003 HC RX 250 WO HCPCS: Performed by: NURSE ANESTHETIST, CERTIFIED REGISTERED

## 2024-02-14 PROCEDURE — 6360000002 HC RX W HCPCS: Performed by: NURSE ANESTHETIST, CERTIFIED REGISTERED

## 2024-02-14 PROCEDURE — 84703 CHORIONIC GONADOTROPIN ASSAY: CPT

## 2024-02-14 PROCEDURE — 2580000003 HC RX 258: Performed by: ANESTHESIOLOGY

## 2024-02-14 PROCEDURE — 7100000011 HC PHASE II RECOVERY - ADDTL 15 MIN: Performed by: INTERNAL MEDICINE

## 2024-02-14 PROCEDURE — 88305 TISSUE EXAM BY PATHOLOGIST: CPT

## 2024-02-14 PROCEDURE — 7100000010 HC PHASE II RECOVERY - FIRST 15 MIN: Performed by: INTERNAL MEDICINE

## 2024-02-14 PROCEDURE — 2709999900 HC NON-CHARGEABLE SUPPLY: Performed by: INTERNAL MEDICINE

## 2024-02-14 PROCEDURE — 3700000001 HC ADD 15 MINUTES (ANESTHESIA): Performed by: INTERNAL MEDICINE

## 2024-02-14 PROCEDURE — 3609012400 HC EGD TRANSORAL BIOPSY SINGLE/MULTIPLE: Performed by: INTERNAL MEDICINE

## 2024-02-14 RX ORDER — PROPOFOL 10 MG/ML
INJECTION, EMULSION INTRAVENOUS PRN
Status: DISCONTINUED | OUTPATIENT
Start: 2024-02-14 | End: 2024-02-14 | Stop reason: SDUPTHER

## 2024-02-14 RX ORDER — SODIUM CHLORIDE, SODIUM LACTATE, POTASSIUM CHLORIDE, CALCIUM CHLORIDE 600; 310; 30; 20 MG/100ML; MG/100ML; MG/100ML; MG/100ML
INJECTION, SOLUTION INTRAVENOUS CONTINUOUS
Status: DISCONTINUED | OUTPATIENT
Start: 2024-02-14 | End: 2024-02-14 | Stop reason: HOSPADM

## 2024-02-14 RX ORDER — LIDOCAINE HYDROCHLORIDE 20 MG/ML
INJECTION, SOLUTION EPIDURAL; INFILTRATION; INTRACAUDAL; PERINEURAL PRN
Status: DISCONTINUED | OUTPATIENT
Start: 2024-02-14 | End: 2024-02-14 | Stop reason: SDUPTHER

## 2024-02-14 RX ADMIN — LIDOCAINE HYDROCHLORIDE 80 MG: 20 INJECTION, SOLUTION EPIDURAL; INFILTRATION; INTRACAUDAL; PERINEURAL at 10:20

## 2024-02-14 RX ADMIN — PROPOFOL 40 MG: 10 INJECTION, EMULSION INTRAVENOUS at 10:25

## 2024-02-14 RX ADMIN — PROPOFOL 60 MG: 10 INJECTION, EMULSION INTRAVENOUS at 10:28

## 2024-02-14 RX ADMIN — SODIUM CHLORIDE, POTASSIUM CHLORIDE, SODIUM LACTATE AND CALCIUM CHLORIDE: 600; 310; 30; 20 INJECTION, SOLUTION INTRAVENOUS at 09:17

## 2024-02-14 RX ADMIN — PROPOFOL 70 MG: 10 INJECTION, EMULSION INTRAVENOUS at 10:21

## 2024-02-14 RX ADMIN — PROPOFOL 40 MG: 10 INJECTION, EMULSION INTRAVENOUS at 10:27

## 2024-02-14 RX ADMIN — PROPOFOL 30 MG: 10 INJECTION, EMULSION INTRAVENOUS at 10:22

## 2024-02-14 RX ADMIN — PROPOFOL 40 MG: 10 INJECTION, EMULSION INTRAVENOUS at 10:24

## 2024-02-14 NOTE — ANESTHESIA POSTPROCEDURE EVALUATION
Department of Anesthesiology  Postprocedure Note    Patient: Priti Daly  MRN: [id number]  YOB: 1990  Date of evaluation: 2/14/2024    Procedure Summary       Date: 02/14/24 Room / Location: [address]    Anesthesia Start: 1012 Anesthesia Stop: 1037    Procedure: EGD BIOPSY (Mouth) Diagnosis:       Gastroesophageal reflux disease, unspecified whether esophagitis present      Irritable bowel syndrome with diarrhea      (Gastroesophageal reflux disease, unspecified whether esophagitis present [K21.9])      (Irritable bowel syndrome with diarrhea [K58.0])    Surgeons: Pratima Virk MD Responsible Provider: Giovanny Perkins MD    Anesthesia Type: MAC, general ASA Status: 2            Anesthesia Type: No value filed.    Chitra Phase I:      Chitra Phase II: Chitra Score: 10    Anesthesia Post Evaluation    Patient location during evaluation: PACU  Patient participation: complete - patient participated  Level of consciousness: awake and alert  Airway patency: patent  Nausea & Vomiting: no nausea and no vomiting  Cardiovascular status: hemodynamically stable  Respiratory status: acceptable  Hydration status: stable  Pain management: adequate    No notable events documented.

## 2024-02-14 NOTE — OP NOTE
PROCEDURE NOTE    DATE OF PROCEDURE: 2/14/2024     SURGEON: Pratima Virk MD    ASSISTANT: None    PREOPERATIVE DIAGNOSIS: GERD  DIARRHEA  IBS    POSTOPERATIVE DIAGNOSIS: As described below    OPERATION: Upper GI endoscopy with Biopsy    ANESTHESIA: MAC PER ANESTHESIA     ESTIMATED BLOOD LOSS: Less than 50 ml    COMPLICATIONS: None.     SPECIMENS:  Was Obtained:     HISTORY: The patient is a 33 y.o. year old female with history of above preop diagnosis.  I recommended esophagogastroduodenoscopy with possible biopsy and I explained the risk, benefits, expected outcome, and alternatives to the procedure.  Risks included but are not limited to bleeding, infection, respiratory distress, hypotension, and perforation of the esophagus, stomach, or duodenum.  Patient understands and is in agreement.    PROCEDURE: The patient was given IV conscious sedation.  The patient's SPO2 remained above 90% throughout the procedure. The gastroscope was inserted orally and advanced under direct vision through the esophagus, through the stomach, through the pylorus, and into the descending duodenum.      Findings:    Retropharyngeal area was grossly normal appearing    Esophagus: normal    Stomach:    Fundus: normal    Body: normal    Antrum: abnormal: MILD TO MOD GASTRITIS WAS BIOPSIED    Duodenum:     Descending: normal  RANDOM BIOPSIES    Bulb: normal    The scope was removed and the patient tolerated the procedure well.     Recommendations/Plan:   F/U Biopsies  F/U In Office in 3-4 weeks  Discussed with the family  Post sedation patient was stable with stable vital signs and stable O2 saturations    Electronically signed by Pratima Virk MD  on 2/14/2024 at 10:30 AM

## 2024-02-14 NOTE — ANESTHESIA PRE PROCEDURE
found for: \"HIV\", \"HEPCAB\"    COVID-19 Screening (If Applicable):   Lab Results   Component Value Date/Time    COVID19 Not Detected 05/01/2020 04:29 PM           Anesthesia Evaluation  Patient summary reviewed and Nursing notes reviewed   no history of anesthetic complications:   Airway: Mallampati: II  TM distance: >3 FB   Neck ROM: full  Mouth opening: > = 3 FB   Dental:          Pulmonary:       (-) COPD and asthma                           Cardiovascular:  Exercise tolerance: no interval change      (-) past MI, CAD and CABG/stent        Rate: normal                    Neuro/Psych:   (+) headaches:, psychiatric history:            GI/Hepatic/Renal:   (+) GERD:          Endo/Other:        (-) diabetes mellitus               Abdominal:             Vascular:          Other Findings:       Anesthesia Plan      MAC and general     ASA 2       Induction: intravenous.    MIPS: prophylactic pharmacologic antiemetic agents not administered perioperatively for documented reasons.  Anesthetic plan and risks discussed with patient.      Plan discussed with CRNA.                Sergio Wagner DO   2/14/2024

## 2024-02-14 NOTE — DISCHARGE INSTRUCTIONS
Upper GI Endoscopy: What to Expect at Home  Your Recovery  You may have a sore throat for a day or two after the test.  How can you care for yourself at home?  Activity  Rest as much as you need to after you go home.  You should be able to go back to your usual activities the day after the test.  Diet  Drink plenty of fluids (unless your doctor has told you not to).  Follow-up care is a key part of your treatment and safety. Be sure to make and go to all appointments, and call your doctor if you are having problems.  When should you call for help?  Call 911 anytime you think you may need emergency care. For example, call if:  You passed out (lost consciousness).  You cough up blood.  You vomit blood or what looks like coffee grounds.  You pass maroon or very bloody stools.  Call your doctor now or seek immediate medical care if:  You have trouble swallowing.  You have belly pain.  Your stools are black and tarlike or have streaks of blood.  You are sick to your stomach or cannot keep fluids down.  Watch closely for changes in your health, and be sure to contact your doctor if:  Your throat still hurts after a day or two.  You do not get better as expected.   Where can you learn more?   Go to [email or web address] and sign in to your [employer] account. Enter J454 in the Search Health Information box to learn more about “Upper GI Endoscopy: What to Expect at Home.”    .     © 8499-8447 [employer]. Care instructions adapted under license by [employer]. This care instruction is for use with your licensed healthcare professional. If you have questions about a medical condition or this instruction, always ask your healthcare professional. [employer] disclaims any warranty or liability for your use of this information.  Content Version: 9.9.367179; Last Revised: February 20, 2013

## 2024-02-17 LAB — SURGICAL PATHOLOGY REPORT: NORMAL

## 2024-02-26 DIAGNOSIS — E66.8 MODERATE OBESITY: ICD-10-CM

## 2024-02-26 DIAGNOSIS — F41.9 ANXIETY: ICD-10-CM

## 2024-02-26 DIAGNOSIS — K58.0 IRRITABLE BOWEL SYNDROME WITH DIARRHEA: ICD-10-CM

## 2024-02-26 DIAGNOSIS — K21.9 GASTROESOPHAGEAL REFLUX DISEASE, UNSPECIFIED WHETHER ESOPHAGITIS PRESENT: ICD-10-CM

## 2024-03-14 ENCOUNTER — OFFICE VISIT (OUTPATIENT)
Dept: GASTROENTEROLOGY | Age: 34
End: 2024-03-14
Payer: COMMERCIAL

## 2024-03-14 VITALS
DIASTOLIC BLOOD PRESSURE: 81 MMHG | TEMPERATURE: 97.5 F | WEIGHT: 258 LBS | BODY MASS INDEX: 40.41 KG/M2 | SYSTOLIC BLOOD PRESSURE: 130 MMHG

## 2024-03-14 DIAGNOSIS — K58.0 IRRITABLE BOWEL SYNDROME WITH DIARRHEA: ICD-10-CM

## 2024-03-14 DIAGNOSIS — K21.9 GASTROESOPHAGEAL REFLUX DISEASE, UNSPECIFIED WHETHER ESOPHAGITIS PRESENT: ICD-10-CM

## 2024-03-14 DIAGNOSIS — F41.9 ANXIETY: ICD-10-CM

## 2024-03-14 DIAGNOSIS — K29.01 OTHER ACUTE GASTRITIS WITH HEMORRHAGE: Primary | ICD-10-CM

## 2024-03-14 PROCEDURE — 99214 OFFICE O/P EST MOD 30 MIN: CPT | Performed by: INTERNAL MEDICINE

## 2024-03-14 ASSESSMENT — ENCOUNTER SYMPTOMS
ABDOMINAL DISTENTION: 0
RECTAL PAIN: 0
COUGH: 0
WHEEZING: 0
SORE THROAT: 0
TROUBLE SWALLOWING: 0
BLOOD IN STOOL: 0
CONSTIPATION: 0
VOICE CHANGE: 0
ANAL BLEEDING: 0
SHORTNESS OF BREATH: 0
DIARRHEA: 1
VOMITING: 0
ABDOMINAL PAIN: 1
CHOKING: 0
NAUSEA: 0
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
## 2024-03-28 ENCOUNTER — PATIENT MESSAGE (OUTPATIENT)
Dept: PRIMARY CARE CLINIC | Age: 34
End: 2024-03-28

## 2024-03-29 DIAGNOSIS — G89.29 CHRONIC LEFT-SIDED LOW BACK PAIN WITHOUT SCIATICA: ICD-10-CM

## 2024-03-29 DIAGNOSIS — M54.50 CHRONIC LEFT-SIDED LOW BACK PAIN WITHOUT SCIATICA: ICD-10-CM

## 2024-03-29 DIAGNOSIS — M79.672 LEFT FOOT PAIN: ICD-10-CM

## 2024-05-01 ASSESSMENT — ENCOUNTER SYMPTOMS: BACK PAIN: 0

## 2024-05-02 ENCOUNTER — OFFICE VISIT (OUTPATIENT)
Dept: PRIMARY CARE CLINIC | Age: 34
End: 2024-05-02
Payer: COMMERCIAL

## 2024-05-02 VITALS
DIASTOLIC BLOOD PRESSURE: 86 MMHG | SYSTOLIC BLOOD PRESSURE: 116 MMHG | OXYGEN SATURATION: 97 % | HEIGHT: 67 IN | WEIGHT: 256.4 LBS | BODY MASS INDEX: 40.24 KG/M2 | HEART RATE: 79 BPM

## 2024-05-02 DIAGNOSIS — Z79.899 MEDICATION MANAGEMENT: Primary | ICD-10-CM

## 2024-05-02 DIAGNOSIS — E66.8 MODERATE OBESITY: ICD-10-CM

## 2024-05-02 DIAGNOSIS — L81.9 HYPERPIGMENTATION: ICD-10-CM

## 2024-05-02 DIAGNOSIS — N92.6 MENSTRUAL IRREGULARITY: ICD-10-CM

## 2024-05-02 PROCEDURE — 99213 OFFICE O/P EST LOW 20 MIN: CPT | Performed by: PHYSICIAN ASSISTANT

## 2024-05-02 NOTE — PROGRESS NOTES
LMP 05/02/2024   SpO2 97%   BMI 40.16 kg/m²   Physical Exam  Vitals and nursing note reviewed.   Constitutional:       Appearance: Normal appearance. She is obese.   Cardiovascular:      Rate and Rhythm: Normal rate and regular rhythm.      Heart sounds: Normal heart sounds.   Pulmonary:      Effort: Pulmonary effort is normal.      Breath sounds: Normal breath sounds.   Skin:     Comments: Hyperpigmentation on left wrist and forearm from recent rash   Neurological:      Mental Status: She is alert and oriented to person, place, and time.   Psychiatric:         Mood and Affect: Mood normal.         Assessment:       Diagnosis Orders   1. Medication management        2. Moderate obesity        3. Hyperpigmentation        4. Menstrual irregularity             Plan:   Increase semaglutide to 0.6mg --sent to [facility]  Continue diet and exercise  Reassured on hyperpigmentation on arm  Reach out to GYN on her light, freq periods.   Send message on how she is doing in 4 weeks and then inoffice or VV at 2 months.     Assessment & Plan   Return in about 2 months (around 7/2/2024) for Weight Management.    No orders of the defined types were placed in this encounter.    No orders of the defined types were placed in this encounter.      Patient given educational materials - see patient instructions.  Discussed use, benefit, and side effects of prescribed medications.  All patient questions answered. Pt voiced understanding.  Reviewed health maintenance.  Instructed to continue current medications, diet and exercise.  Patient agreed with treatment plan. Follow up as directed.     Electronically signed by Mary Kate Sykes PA-C on 5/2/2024 at 3:49 PM

## 2024-06-03 RX ORDER — SEMAGLUTIDE 1 MG/.5ML
1 INJECTION, SOLUTION SUBCUTANEOUS
Qty: 2 ML | Refills: 1 | Status: SHIPPED | OUTPATIENT
Start: 2024-06-03

## 2024-06-04 DIAGNOSIS — G47.00 INSOMNIA, UNSPECIFIED TYPE: ICD-10-CM

## 2024-06-04 RX ORDER — TRAZODONE HYDROCHLORIDE 50 MG/1
50 TABLET ORAL NIGHTLY
Qty: 90 TABLET | Refills: 3 | Status: SHIPPED | OUTPATIENT
Start: 2024-06-04

## 2024-07-01 SDOH — ECONOMIC STABILITY: FOOD INSECURITY: WITHIN THE PAST 12 MONTHS, YOU WORRIED THAT YOUR FOOD WOULD RUN OUT BEFORE YOU GOT MONEY TO BUY MORE.: NEVER TRUE

## 2024-07-01 SDOH — ECONOMIC STABILITY: INCOME INSECURITY: HOW HARD IS IT FOR YOU TO PAY FOR THE VERY BASICS LIKE FOOD, HOUSING, MEDICAL CARE, AND HEATING?: NOT VERY HARD

## 2024-07-01 SDOH — ECONOMIC STABILITY: FOOD INSECURITY: WITHIN THE PAST 12 MONTHS, THE FOOD YOU BOUGHT JUST DIDN'T LAST AND YOU DIDN'T HAVE MONEY TO GET MORE.: NEVER TRUE

## 2024-07-01 ASSESSMENT — ENCOUNTER SYMPTOMS: BACK PAIN: 0

## 2024-07-02 ENCOUNTER — OFFICE VISIT (OUTPATIENT)
Dept: PRIMARY CARE CLINIC | Age: 34
End: 2024-07-02
Payer: COMMERCIAL

## 2024-07-02 ENCOUNTER — PATIENT MESSAGE (OUTPATIENT)
Dept: PRIMARY CARE CLINIC | Age: 34
End: 2024-07-02

## 2024-07-02 VITALS
BODY MASS INDEX: 39.27 KG/M2 | HEART RATE: 101 BPM | WEIGHT: 250.2 LBS | SYSTOLIC BLOOD PRESSURE: 118 MMHG | OXYGEN SATURATION: 96 % | DIASTOLIC BLOOD PRESSURE: 80 MMHG | HEIGHT: 67 IN

## 2024-07-02 DIAGNOSIS — T30.0 SKIN BURN: ICD-10-CM

## 2024-07-02 DIAGNOSIS — Z79.899 MEDICATION MANAGEMENT: Primary | ICD-10-CM

## 2024-07-02 DIAGNOSIS — E66.8 MODERATE OBESITY: ICD-10-CM

## 2024-07-02 DIAGNOSIS — F41.1 GENERALIZED ANXIETY DISORDER: ICD-10-CM

## 2024-07-02 PROCEDURE — 99214 OFFICE O/P EST MOD 30 MIN: CPT | Performed by: PHYSICIAN ASSISTANT

## 2024-07-02 RX ORDER — CETIRIZINE HYDROCHLORIDE 10 MG/1
10 TABLET, CHEWABLE ORAL DAILY
COMMUNITY

## 2024-07-02 RX ORDER — DESVENLAFAXINE SUCCINATE 50 MG/1
50 TABLET, EXTENDED RELEASE ORAL DAILY
Qty: 30 TABLET | Refills: 3 | Status: SHIPPED | OUTPATIENT
Start: 2024-07-02

## 2024-07-02 ASSESSMENT — ENCOUNTER SYMPTOMS
NAUSEA: 0
VOMITING: 1
ABDOMINAL PAIN: 0
DIARRHEA: 1
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
## 2024-07-02 NOTE — PATIENT INSTRUCTIONS
Patient Education        Herniated Disc: Exercises  Introduction  Here are some examples of exercises for you to try. The exercises may be suggested for a condition or for rehabilitation. Start each exercise slowly. Ease off the exercises if you start to have pain.  You will be told when to start these exercises and which ones will work best for you.  How to stay safe  These exercises can help you move easier and feel better. But when you first start doing them, you may have more pain in your back. This is normal. But it is important to pay close attention to your pain during and after each exercise.  Keep doing these exercises if your pain stays the same or moves from your leg and buttock more toward the middle of your spine. Pain moving out of your leg and buttock is a good sign.  Stop doing these exercises if your pain gets worse in your leg and buttock. Stop if you start to have pain in your leg and buttock that you didn't have before.  Be sure to do these exercises in the order they appear. Note how your pain changes before you move to the next one.  If your pain is much worse right after exercise and stays worse the next day, do not do any of these exercises.  How to do the exercises  Resting on your belly    Lie on your stomach, face down or with your head turned to the side.  Place your arms beside your body. If this bothers your neck, place your hands, one on top of the other, underneath your head. This will help support your head and neck.  Try to relax your lower back muscles as much as you can.  Continue to lie on your stomach for 2 minutes.  If your pain spreads down your leg or increases down your leg, stop this exercise and do not do the next exercises.  Press-up back extension    Lie on your stomach, face down. Keep your elbows tucked into your sides and under your shoulders.  Press your elbows down into the floor to raise your upper back. As you do this, relax your stomach muscles. Allow your back

## 2024-07-08 ENCOUNTER — PATIENT MESSAGE (OUTPATIENT)
Dept: PRIMARY CARE CLINIC | Age: 34
End: 2024-07-08

## 2024-07-08 RX ORDER — DESVENLAFAXINE 25 MG/1
25 TABLET, EXTENDED RELEASE ORAL DAILY
Qty: 30 TABLET | Refills: 0 | Status: SHIPPED | OUTPATIENT
Start: 2024-07-08

## 2024-07-18 ENCOUNTER — TELEPHONE (OUTPATIENT)
Dept: PRIMARY CARE CLINIC | Age: 34
End: 2024-07-18

## 2024-07-18 NOTE — TELEPHONE ENCOUNTER
Patient called states she has diarrhea, nausea and burping with bad taste x3 days. Has tried metamucil and antidiarrheal. Pharm horacio camargo

## 2024-07-18 NOTE — TELEPHONE ENCOUNTER
Ok, give it a few more days to clear in case it is just a viral infection. Use the Pepcid. If continues, need an abdominal x-ray.

## 2024-07-19 ENCOUNTER — APPOINTMENT (OUTPATIENT)
Dept: CT IMAGING | Facility: CLINIC | Age: 34
End: 2024-07-19
Payer: COMMERCIAL

## 2024-07-19 ENCOUNTER — HOSPITAL ENCOUNTER (EMERGENCY)
Facility: CLINIC | Age: 34
Discharge: HOME | End: 2024-07-19
Attending: EMERGENCY MEDICINE
Payer: COMMERCIAL

## 2024-07-19 VITALS
SYSTOLIC BLOOD PRESSURE: 128 MMHG | BODY MASS INDEX: 37.67 KG/M2 | DIASTOLIC BLOOD PRESSURE: 71 MMHG | HEIGHT: 67 IN | TEMPERATURE: 97.8 F | RESPIRATION RATE: 15 BRPM | WEIGHT: 240 LBS | HEART RATE: 74 BPM | OXYGEN SATURATION: 98 %

## 2024-07-19 DIAGNOSIS — R10.13 EPIGASTRIC PAIN: Primary | ICD-10-CM

## 2024-07-19 DIAGNOSIS — K52.9 COLITIS: ICD-10-CM

## 2024-07-19 LAB
ALBUMIN SERPL-MCNC: 4.7 G/DL (ref 3.5–5.2)
ALBUMIN/GLOB SERPL: 1.4 {RATIO} (ref 1–2.5)
ALP SERPL-CCNC: 114 U/L (ref 35–104)
ALT SERPL-CCNC: 59 U/L (ref 5–33)
ANION GAP SERPL CALCULATED.3IONS-SCNC: 16 MMOL/L (ref 9–17)
AST SERPL-CCNC: 31 U/L
BASOPHILS # BLD: 0.2 K/UL (ref 0–0.2)
BASOPHILS NFR BLD: 1 % (ref 0–2)
BILIRUB SERPL-MCNC: 0.4 MG/DL (ref 0.3–1.2)
BILIRUB UR QL STRIP: NEGATIVE
BUN SERPL-MCNC: 8 MG/DL (ref 6–20)
CALCIUM SERPL-MCNC: 9.4 MG/DL (ref 8.6–10.4)
CHLORIDE SERPL-SCNC: 101 MMOL/L (ref 98–107)
CLARITY UR: CLEAR
CO2 SERPL-SCNC: 21 MMOL/L (ref 20–31)
COLOR UR: YELLOW
COMMENT: NORMAL
CREAT SERPL-MCNC: 0.5 MG/DL (ref 0.5–0.9)
DATE, STOOL #1: NORMAL
EOSINOPHIL # BLD: 0.2 K/UL (ref 0–0.4)
EOSINOPHILS RELATIVE PERCENT: 2 % (ref 1–4)
ERYTHROCYTE [DISTWIDTH] IN BLOOD BY AUTOMATED COUNT: 14.8 % (ref 12.5–15.4)
GFR, ESTIMATED: >90 ML/MIN/1.73M2
GLUCOSE SERPL-MCNC: 105 MG/DL (ref 70–99)
GLUCOSE UR STRIP-MCNC: NEGATIVE MG/DL
HCG SERPL QL: NEGATIVE
HCT VFR BLD AUTO: 43.5 % (ref 36–46)
HEMOCCULT SP1 STL QL: NEGATIVE
HGB BLD-MCNC: 14.6 G/DL (ref 12–16)
HGB UR QL STRIP.AUTO: NEGATIVE
KETONES UR STRIP-MCNC: NEGATIVE MG/DL
LEUKOCYTE ESTERASE UR QL STRIP: NEGATIVE
LIPASE SERPL-CCNC: 23 U/L (ref 13–60)
LYMPHOCYTES NFR BLD: 2 K/UL (ref 1–4.8)
LYMPHOCYTES RELATIVE PERCENT: 15 % (ref 24–44)
MCH RBC QN AUTO: 29.1 PG (ref 26–34)
MCHC RBC AUTO-ENTMCNC: 33.7 G/DL (ref 31–37)
MCV RBC AUTO: 86.5 FL (ref 80–100)
MONOCYTES NFR BLD: 1 K/UL (ref 0.1–1.2)
MONOCYTES NFR BLD: 7 % (ref 2–11)
NEUTROPHILS NFR BLD: 75 % (ref 36–66)
NEUTS SEG NFR BLD: 10.5 K/UL (ref 1.8–7.7)
NITRITE UR QL STRIP: NEGATIVE
PH UR STRIP: 5.5 [PH] (ref 5–8)
PLATELET # BLD AUTO: 346 K/UL (ref 140–450)
PMV BLD AUTO: 7.5 FL (ref 6–12)
POTASSIUM SERPL-SCNC: 3.8 MMOL/L (ref 3.7–5.3)
PROT SERPL-MCNC: 8.1 G/DL (ref 6.4–8.3)
PROT UR STRIP-MCNC: NEGATIVE MG/DL
RBC # BLD AUTO: 5.03 M/UL (ref 4–5.2)
ROTAVIRUS ANTIGEN: NEGATIVE
SODIUM SERPL-SCNC: 138 MMOL/L (ref 135–144)
SOURCE, 60200063: NORMAL
SP GR UR STRIP: 1.01 (ref 1–1.03)
TIME, STOOL #1: 235
UROBILINOGEN UR STRIP-ACNC: NORMAL EU/DL (ref 0–1)
WBC OTHER # BLD: 13.9 K/UL (ref 3.5–11)

## 2024-07-19 PROCEDURE — 2580000003 HC RX 258: Performed by: EMERGENCY MEDICINE

## 2024-07-19 PROCEDURE — 87329 GIARDIA AG IA: CPT

## 2024-07-19 PROCEDURE — 80053 COMPREHEN METABOLIC PANEL: CPT

## 2024-07-19 PROCEDURE — 87425 ROTAVIRUS AG IA: CPT

## 2024-07-19 PROCEDURE — 81003 URINALYSIS AUTO W/O SCOPE: CPT

## 2024-07-19 PROCEDURE — 87324 CLOSTRIDIUM AG IA: CPT

## 2024-07-19 PROCEDURE — 96372 THER/PROPH/DIAG INJ SC/IM: CPT

## 2024-07-19 PROCEDURE — 36415 COLL VENOUS BLD VENIPUNCTURE: CPT

## 2024-07-19 PROCEDURE — 87449 NOS EACH ORGANISM AG IA: CPT

## 2024-07-19 PROCEDURE — 74177 CT ABD & PELVIS W/CONTRAST: CPT

## 2024-07-19 PROCEDURE — 99285 EMERGENCY DEPT VISIT HI MDM: CPT

## 2024-07-19 PROCEDURE — 6360000004 HC RX CONTRAST MEDICATION: Performed by: EMERGENCY MEDICINE

## 2024-07-19 PROCEDURE — 87506 IADNA-DNA/RNA PROBE TQ 6-11: CPT

## 2024-07-19 PROCEDURE — 87328 CRYPTOSPORIDIUM AG IA: CPT

## 2024-07-19 PROCEDURE — 82270 OCCULT BLOOD FECES: CPT

## 2024-07-19 PROCEDURE — 6360000002 HC RX W HCPCS: Performed by: EMERGENCY MEDICINE

## 2024-07-19 PROCEDURE — 85025 COMPLETE CBC W/AUTO DIFF WBC: CPT

## 2024-07-19 PROCEDURE — 84703 CHORIONIC GONADOTROPIN ASSAY: CPT

## 2024-07-19 PROCEDURE — 2500000003 HC RX 250 WO HCPCS: Performed by: EMERGENCY MEDICINE

## 2024-07-19 PROCEDURE — 96374 THER/PROPH/DIAG INJ IV PUSH: CPT

## 2024-07-19 PROCEDURE — 83690 ASSAY OF LIPASE: CPT

## 2024-07-19 PROCEDURE — 6370000000 HC RX 637 (ALT 250 FOR IP): Performed by: EMERGENCY MEDICINE

## 2024-07-19 RX ORDER — CIPROFLOXACIN 250 MG/1
500 TABLET, FILM COATED ORAL ONCE
Status: COMPLETED | OUTPATIENT
Start: 2024-07-19 | End: 2024-07-19

## 2024-07-19 RX ORDER — CIPROFLOXACIN 500 MG/1
500 TABLET, FILM COATED ORAL 2 TIMES DAILY
Qty: 14 TABLET | Refills: 0 | Status: SHIPPED | OUTPATIENT
Start: 2024-07-19 | End: 2024-07-26

## 2024-07-19 RX ORDER — LOPERAMIDE HYDROCHLORIDE 2 MG/1
2 CAPSULE ORAL 4 TIMES DAILY PRN
Status: DISCONTINUED | OUTPATIENT
Start: 2024-07-19 | End: 2024-07-19 | Stop reason: HOSPADM

## 2024-07-19 RX ORDER — 0.9 % SODIUM CHLORIDE 0.9 %
1000 INTRAVENOUS SOLUTION INTRAVENOUS ONCE
Status: COMPLETED | OUTPATIENT
Start: 2024-07-19 | End: 2024-07-19

## 2024-07-19 RX ORDER — GLYCOPYRROLATE 0.2 MG/ML
0.2 INJECTION INTRAMUSCULAR; INTRAVENOUS ONCE
Status: DISCONTINUED | OUTPATIENT
Start: 2024-07-19 | End: 2024-07-19

## 2024-07-19 RX ORDER — SODIUM CHLORIDE 0.9 % (FLUSH) 0.9 %
10 SYRINGE (ML) INJECTION PRN
Status: DISCONTINUED | OUTPATIENT
Start: 2024-07-19 | End: 2024-07-19 | Stop reason: HOSPADM

## 2024-07-19 RX ORDER — KETOROLAC TROMETHAMINE 30 MG/ML
30 INJECTION, SOLUTION INTRAMUSCULAR; INTRAVENOUS ONCE
Status: DISCONTINUED | OUTPATIENT
Start: 2024-07-19 | End: 2024-07-19

## 2024-07-19 RX ORDER — 0.9 % SODIUM CHLORIDE 0.9 %
80 INTRAVENOUS SOLUTION INTRAVENOUS ONCE
Status: DISCONTINUED | OUTPATIENT
Start: 2024-07-19 | End: 2024-07-19 | Stop reason: HOSPADM

## 2024-07-19 RX ORDER — DICYCLOMINE HYDROCHLORIDE 10 MG/ML
10 INJECTION INTRAMUSCULAR ONCE
Status: COMPLETED | OUTPATIENT
Start: 2024-07-19 | End: 2024-07-19

## 2024-07-19 RX ADMIN — SODIUM CHLORIDE 1000 ML: 9 INJECTION, SOLUTION INTRAVENOUS at 04:01

## 2024-07-19 RX ADMIN — SODIUM CHLORIDE 1000 ML: 9 INJECTION, SOLUTION INTRAVENOUS at 02:31

## 2024-07-19 RX ADMIN — Medication 80 ML: at 02:20

## 2024-07-19 RX ADMIN — LOPERAMIDE HYDROCHLORIDE 2 MG: 2 CAPSULE ORAL at 04:54

## 2024-07-19 RX ADMIN — FAMOTIDINE 20 MG: 10 INJECTION, SOLUTION INTRAVENOUS at 04:03

## 2024-07-19 RX ADMIN — DICYCLOMINE HYDROCHLORIDE 10 MG: 10 INJECTION, SOLUTION INTRAMUSCULAR at 04:56

## 2024-07-19 RX ADMIN — SODIUM CHLORIDE, PRESERVATIVE FREE 10 ML: 5 INJECTION INTRAVENOUS at 03:01

## 2024-07-19 RX ADMIN — CIPROFLOXACIN 500 MG: 250 TABLET, FILM COATED ORAL at 05:25

## 2024-07-19 RX ADMIN — Medication 80 ML: at 02:52

## 2024-07-19 RX ADMIN — IOPAMIDOL 75 ML: 755 INJECTION, SOLUTION INTRAVENOUS at 02:55

## 2024-07-19 ASSESSMENT — PAIN DESCRIPTION - FREQUENCY
FREQUENCY: CONTINUOUS
FREQUENCY: CONTINUOUS

## 2024-07-19 ASSESSMENT — PAIN - FUNCTIONAL ASSESSMENT
PAIN_FUNCTIONAL_ASSESSMENT: 0-10
PAIN_FUNCTIONAL_ASSESSMENT: 0-10

## 2024-07-19 ASSESSMENT — PAIN DESCRIPTION - ORIENTATION: ORIENTATION: LEFT;LOWER

## 2024-07-19 ASSESSMENT — ENCOUNTER SYMPTOMS
NAUSEA: 1
VOMITING: 1
DIARRHEA: 1
ABDOMINAL PAIN: 1

## 2024-07-19 ASSESSMENT — PAIN DESCRIPTION - LOCATION
LOCATION: ABDOMEN

## 2024-07-19 ASSESSMENT — PAIN DESCRIPTION - DESCRIPTORS
DESCRIPTORS: CRAMPING
DESCRIPTORS: CRAMPING

## 2024-07-19 ASSESSMENT — PAIN SCALES - GENERAL
PAINLEVEL_OUTOF10: 5
PAINLEVEL_OUTOF10: 8
PAINLEVEL_OUTOF10: 8

## 2024-07-19 ASSESSMENT — PAIN DESCRIPTION - PAIN TYPE
TYPE: ACUTE PAIN
TYPE: ACUTE PAIN

## 2024-07-19 NOTE — DISCHARGE INSTRUCTIONS
Take antibiotics as prescribed.  Stool studies should be back in 1 to 2 days.  Return to the ER for worsening or any other concerns.

## 2024-07-19 NOTE — ED NOTES
Pt has been up to the restroom with diarrhea  x6 episodes since arrival. Continues to c/o abdominal cramping

## 2024-07-19 NOTE — ED NOTES
Pt presents to the ED via private auto accompanied by her SO. Pt states she has been experiencing severe abdominal pain  and diarrhea x3 days and vomiting x1 day.

## 2024-07-19 NOTE — ED PROVIDER NOTES
[facility]  [address] [facility] [address]  Phone: [phone]        [facility] ED  EMERGENCY DEPARTMENT ENCOUNTER      Pt Name: Priti Daly  MRN: [id number]  Birthdate 1990  Date of evaluation: 7/19/2024  Provider: Martita Thorne MD    CHIEF COMPLAINT       Chief Complaint   Patient presents with    Diarrhea     X3 days    Abdominal Pain     X3 days    Emesis     x1day         HISTORY OF PRESENT ILLNESS   (Location/Symptom, Timing/Onset,Context/Setting, Quality, Duration, Modifying Factors, Severity)  Note limiting factors.   Priti Daly is a 34 y.o. female who presents to the emergency department with complaints of diarrhea and abdominal pain for the last 3 days.  She is also had vomiting today.  She complains of foul-smelling belching.  She did have a recent EGD which did show some mild to moderate gastritis of the gastric antrum.  She denies fever or chills.  Nursing Notes were reviewed.    REVIEW OF SYSTEMS    (2-9systems for level 4, 10 or more for level 5)     Review of Systems   Gastrointestinal:  Positive for abdominal pain, diarrhea, nausea and vomiting.       Except asnoted above the remainder of the review of systems was reviewed and negative.       PAST MEDICAL HISTORY     Past Medical History:   Diagnosis Date    Factor V Leiden mutation (HCC)     H/O concussion     H/O contact dermatitis     H/O irritable bowel syndrome     Migraines     Pain of anterior chest wall with respiration          SURGICAL HISTORY       Past Surgical History:   Procedure Laterality Date    CHOLECYSTECTOMY      COLONOSCOPY      PARTIAL HYMENECTOMY  01/01/2008    SKIN BIOPSY      UPPER GASTROINTESTINAL ENDOSCOPY  2004    UPPER GASTROINTESTINAL ENDOSCOPY N/A 2/14/2024    EGD BIOPSY performed by Pratima Virk MD at [facility] OR    WISDOM TOOTH EXTRACTION Bilateral          CURRENT MEDICATIONS     Previous Medications    ACETAMINOPHEN (TYLENOL) 500 MG TABLET    Take 1 tablet     Result Value    WBC 13.9 (*)     Neutrophils % 75 (*)     Lymphocytes % 15 (*)     Neutrophils Absolute 10.50 (*)     All other components within normal limits   COMPREHENSIVE METABOLIC PANEL - Abnormal; Notable for the following components:    Glucose 105 (*)     Alkaline Phosphatase 114 (*)     ALT 59 (*)     All other components within normal limits   GASTROINTESTINAL PANEL, MOLECULAR   C DIFF TOXIN/ANTIGEN   LIPASE   URINALYSIS   HCG, SERUM, QUALITATIVE   BLOOD OCCULT STOOL SCREEN #1   ROTAVIRUS ANTIGEN, STOOL   GIARDIA / CRYPTOSPORIDUM ANTIGENS       All other labs were within normal range or not returned as of this dictation.    RADIOLOGY:  CT ABDOMEN PELVIS W IV CONTRAST Additional Contrast? None    (Results Pending)   Mild diffuse thickening of distal colon associated with mild fat stranding consistent with nonspecific colitis (infectious or inflammatory i.e. ulcerative since affecting the rectum and descending colon.  No pneumatosis.  Appendix is not seen but no findings in the right lower quadrant to indicate appendicitis.             PROCEDURES:  Unless otherwise noted below, none     Procedures    FINAL IMPRESSION      1. Epigastric pain    2. Colitis          DISPOSITION/PLAN   DISPOSITION Decision To Discharge 07/19/2024 05:23:11 AM      PATIENT REFERRED TO:  Mary Kate Sykes PA-C  [address] [id number] [phone]    In 3 days  If symptoms worsen    Pratima Virk MD  [address] [place] [address], [place] [id number] [phone]    Go to   As scheduled      DISCHARGE MEDICATIONS:  New Prescriptions    CIPROFLOXACIN (CIPRO) 500 MG TABLET    Take 1 tablet by mouth 2 times daily for 7 days          (Please note that portions of this note were completed with a voice recognition program.  Efforts were made to edit the dictations but occasionally words are mis-transcribed.)    Martita Thorne MD,(electronically signed)  Board Certified Emergency Physician

## 2024-07-20 LAB
C DIFF GDH + TOXINS A+B STL QL IA.RAPID: NEGATIVE
CAMPYLOBACTER DNA SPEC NAA+PROBE: NORMAL
ETEC ELTA+ESTB GENES STL QL NAA+PROBE: NORMAL
P SHIGELLOIDES DNA STL QL NAA+PROBE: NORMAL
SALMONELLA DNA SPEC QL NAA+PROBE: NORMAL
SHIGA TOXIN STX GENE SPEC NAA+PROBE: NORMAL
SHIGELLA DNA SPEC QL NAA+PROBE: NORMAL
SPECIMEN DESCRIPTION: NORMAL
SPECIMEN DESCRIPTION: NORMAL
V CHOL+PARA RFBL+TRKH+TNAA STL QL NAA+PR: NORMAL
Y ENTERO RECN STL QL NAA+PROBE: NORMAL

## 2024-07-22 LAB
C PARVUM AG STL QL IA: NEGATIVE
G LAMBLIA AG STL QL IA: NEGATIVE
SOURCE: NORMAL
SPECIMEN DESCRIPTION: NORMAL

## 2024-07-23 ENCOUNTER — OFFICE VISIT (OUTPATIENT)
Dept: GASTROENTEROLOGY | Age: 34
End: 2024-07-23
Payer: COMMERCIAL

## 2024-07-23 VITALS
DIASTOLIC BLOOD PRESSURE: 90 MMHG | BODY MASS INDEX: 38.22 KG/M2 | TEMPERATURE: 97.7 F | SYSTOLIC BLOOD PRESSURE: 128 MMHG | WEIGHT: 244 LBS

## 2024-07-23 DIAGNOSIS — F41.1 GENERALIZED ANXIETY DISORDER: ICD-10-CM

## 2024-07-23 DIAGNOSIS — K21.9 GASTROESOPHAGEAL REFLUX DISEASE, UNSPECIFIED WHETHER ESOPHAGITIS PRESENT: ICD-10-CM

## 2024-07-23 DIAGNOSIS — K58.0 IRRITABLE BOWEL SYNDROME WITH DIARRHEA: Primary | ICD-10-CM

## 2024-07-23 DIAGNOSIS — E66.8 MODERATE OBESITY: ICD-10-CM

## 2024-07-23 DIAGNOSIS — F41.9 ANXIETY: ICD-10-CM

## 2024-07-23 PROCEDURE — 99214 OFFICE O/P EST MOD 30 MIN: CPT | Performed by: INTERNAL MEDICINE

## 2024-07-23 RX ORDER — CHOLESTYRAMINE 4 G/9G
1 POWDER, FOR SUSPENSION ORAL 2 TIMES DAILY
Qty: 90 PACKET | Refills: 3 | Status: SHIPPED | OUTPATIENT
Start: 2024-07-23

## 2024-07-23 ASSESSMENT — ENCOUNTER SYMPTOMS
SHORTNESS OF BREATH: 0
ABDOMINAL DISTENTION: 0
ANAL BLEEDING: 0
DIARRHEA: 1
VOMITING: 1
VOICE CHANGE: 0
TROUBLE SWALLOWING: 0
COUGH: 0
ABDOMINAL PAIN: 1
CONSTIPATION: 0
RECTAL PAIN: 0
SORE THROAT: 0
BLOOD IN STOOL: 0
CHOKING: 0
NAUSEA: 0
WHEEZING: 0

## 2024-07-23 NOTE — PROGRESS NOTES
half of patient's clinic visit time was spent in counseling about lifestyle and dietary modifications  Patient's  questions were answered in this regard as well  The patient has verbalized understanding and agreement       Thank you for allowing me to participate in the care of Ms. Daly. For any further questions please do not hesitate to contact me.    I have reviewed and agree with the ROS entered by the MA/Nurse.     This note is created with the assistance of the speech recognition program.  While intending to generate a document that actually reflects the content of the visit, document can still have some errors including those of syntax and sound like substitutions which may escape proof reading.  Actual meaning can be extrapolated by contextual diversion.     Pratima Virk MD, FACG  Board Certified in Gastroenterology and Internal Medicine  [facility]  Office #: [phone]

## 2024-07-24 ENCOUNTER — TELEPHONE (OUTPATIENT)
Dept: GASTROENTEROLOGY | Age: 34
End: 2024-07-24

## 2024-07-24 NOTE — TELEPHONE ENCOUNTER
Patient called in requesting colonoscopy order / referral to be sent to Dr Ana Rosa Ch [facility] fax [phone]. She was seen in office yesterday and does not wish to wait until beginning of next year to have procedure. Please return her call to discuss      Thank you

## 2024-07-26 NOTE — TELEPHONE ENCOUNTER
Late entry:  Patient was seen in the office 7/23/24.  Colonoscopy was ordered.  The patient was informed that when we are able to schedule that the provider was into January.  Did not want to wait that long.  Mentioned that she may have done elsewhere.

## 2024-08-07 ENCOUNTER — PATIENT MESSAGE (OUTPATIENT)
Dept: PRIMARY CARE CLINIC | Age: 34
End: 2024-08-07

## 2024-08-07 DIAGNOSIS — G89.29 CHRONIC LEFT-SIDED LOW BACK PAIN WITHOUT SCIATICA: ICD-10-CM

## 2024-08-07 DIAGNOSIS — M54.50 CHRONIC LEFT-SIDED LOW BACK PAIN WITHOUT SCIATICA: ICD-10-CM

## 2024-08-07 DIAGNOSIS — M79.672 LEFT FOOT PAIN: ICD-10-CM

## 2024-08-07 RX ORDER — SEMAGLUTIDE 1 MG/.5ML
1 INJECTION, SOLUTION SUBCUTANEOUS
Qty: 2 ML | Refills: 0 | Status: SHIPPED | OUTPATIENT
Start: 2024-08-07

## 2024-08-07 NOTE — TELEPHONE ENCOUNTER
From: Priti Daly  To: Mary Kate Sykes  Sent: 8/7/2024 4:12 PM EDT  Subject: Semaglutide    I spoke with my insurance again they will cover the Wegovy (semaglutide) with prior authorization. Can we please send it to my [facility] with the prior authorization so I can see if it is cheaper then getting it at [facility] drug store in [place] please

## 2024-08-08 RX ORDER — CELECOXIB 100 MG/1
100 CAPSULE ORAL DAILY
Qty: 60 CAPSULE | Refills: 3 | OUTPATIENT
Start: 2024-08-08

## 2024-08-12 RX ORDER — DESVENLAFAXINE 25 MG/1
25 TABLET, EXTENDED RELEASE ORAL DAILY
Qty: 30 TABLET | Refills: 2 | Status: SHIPPED | OUTPATIENT
Start: 2024-08-12

## 2024-08-12 RX ORDER — DESVENLAFAXINE SUCCINATE 50 MG/1
50 TABLET, EXTENDED RELEASE ORAL DAILY
Qty: 30 TABLET | Refills: 2 | Status: SHIPPED | OUTPATIENT
Start: 2024-08-12 | End: 2024-08-12 | Stop reason: DRUGHIGH

## 2024-08-14 ENCOUNTER — PATIENT MESSAGE (OUTPATIENT)
Dept: PRIMARY CARE CLINIC | Age: 34
End: 2024-08-14

## 2024-08-27 ENCOUNTER — PATIENT MESSAGE (OUTPATIENT)
Dept: PRIMARY CARE CLINIC | Age: 34
End: 2024-08-27

## 2024-08-29 RX ORDER — SEMAGLUTIDE 1.7 MG/.75ML
1.7 INJECTION, SOLUTION SUBCUTANEOUS
Qty: 3 ML | Refills: 0 | Status: SHIPPED | OUTPATIENT
Start: 2024-08-29

## 2024-09-10 RX ORDER — DESVENLAFAXINE 25 MG/1
25 TABLET, EXTENDED RELEASE ORAL DAILY
Qty: 90 TABLET | Refills: 1 | Status: SHIPPED | OUTPATIENT
Start: 2024-09-10

## 2024-09-23 RX ORDER — SEMAGLUTIDE 1.7 MG/.75ML
1.7 INJECTION, SOLUTION SUBCUTANEOUS
Qty: 3 ML | Refills: 0 | Status: SHIPPED | OUTPATIENT
Start: 2024-09-23

## 2024-09-29 ENCOUNTER — PATIENT MESSAGE (OUTPATIENT)
Dept: PRIMARY CARE CLINIC | Age: 34
End: 2024-09-29

## 2024-10-01 RX ORDER — CYCLOBENZAPRINE HCL 5 MG
5 TABLET ORAL NIGHTLY PRN
Qty: 30 TABLET | Refills: 0 | Status: SHIPPED | OUTPATIENT
Start: 2024-10-01 | End: 2024-10-31

## 2024-10-06 PROBLEM — F45.8 BRUXISM: Status: ACTIVE | Noted: 2024-10-06

## 2024-10-06 PROBLEM — R68.84 JAW PAIN: Status: ACTIVE | Noted: 2024-10-06

## 2024-10-16 ASSESSMENT — ENCOUNTER SYMPTOMS: BACK PAIN: 0

## 2024-10-17 ENCOUNTER — OFFICE VISIT (OUTPATIENT)
Dept: PRIMARY CARE CLINIC | Age: 34
End: 2024-10-17
Payer: COMMERCIAL

## 2024-10-17 VITALS
HEIGHT: 67 IN | BODY MASS INDEX: 36.6 KG/M2 | WEIGHT: 233.2 LBS | SYSTOLIC BLOOD PRESSURE: 118 MMHG | DIASTOLIC BLOOD PRESSURE: 80 MMHG | HEART RATE: 76 BPM | OXYGEN SATURATION: 99 %

## 2024-10-17 DIAGNOSIS — F45.8 BRUXISM: Primary | ICD-10-CM

## 2024-10-17 DIAGNOSIS — R68.84 JAW PAIN: ICD-10-CM

## 2024-10-17 DIAGNOSIS — E66.9 MODERATE OBESITY: ICD-10-CM

## 2024-10-17 DIAGNOSIS — R73.01 IMPAIRED FASTING GLUCOSE: ICD-10-CM

## 2024-10-17 DIAGNOSIS — R53.82 CHRONIC FATIGUE: ICD-10-CM

## 2024-10-17 PROCEDURE — 99214 OFFICE O/P EST MOD 30 MIN: CPT | Performed by: PHYSICIAN ASSISTANT

## 2024-10-17 RX ORDER — SEMAGLUTIDE 2.4 MG/.75ML
2.4 INJECTION, SOLUTION SUBCUTANEOUS
Qty: 3 ML | Refills: 2 | Status: SHIPPED | OUTPATIENT
Start: 2024-10-17

## 2024-10-17 NOTE — PROGRESS NOTES
[facility]  [facility] PRIMARY [facility]ARE  [address]  [facility] [place] [address]  Dept: [phone]    Priti Daly is a 34 y.o. female who presents today for her medical conditions/complaints as noted below.      Chief Complaint   Patient presents with    Weight Management     Patient is here for a weight management f/u - patient is taking wegovy 1.7 mg - patient feels like she has hit a plateau        HPI:     HPI  Weight is still reducing ---lost 11 pounds since last seen but 1 pound on her scale since August  Taking Wegovy 1.7mg and tolerating it  Walking 1 mile a day and will be starting circuit training.   She is very fatigued. Normal sleep study. Taking B12  Muscle relaxer helping the grinding/jaw pain  No components found for: \"LDLCHOLESTEROL\", \"LDLCALC\"    (goal LDL is <100)   AST (U/L)   Date Value   07/19/2024 31     ALT (U/L)   Date Value   07/19/2024 59 (H)     BUN (mg/dL)   Date Value   07/19/2024 8     BP Readings from Last 3 Encounters:   10/17/24 118/80   07/23/24 (!) 128/90   07/19/24 128/71          (goal 120/80)    Past Medical History:   Diagnosis Date    Factor V Leiden mutation (HCC)     H/O concussion     H/O contact dermatitis     H/O irritable bowel syndrome     Migraines     Pain of anterior chest wall with respiration       Past Surgical History:   Procedure Laterality Date    CHOLECYSTECTOMY      COLONOSCOPY      PARTIAL HYMENECTOMY  01/01/2008    SKIN BIOPSY      UPPER GASTROINTESTINAL ENDOSCOPY  2004    UPPER GASTROINTESTINAL ENDOSCOPY N/A 2/14/2024    EGD BIOPSY performed by Pratima Virk MD at [facility]    WISDOM TOOTH EXTRACTION Bilateral        Family History   Problem Relation Age of Onset    Heart Disease Father     Heart Disease Maternal Grandmother     Diabetes Maternal Grandfather     Heart Disease Maternal Grandfather     Cancer Paternal Grandmother         breast    Heart Disease Paternal Grandmother     Heart Disease Paternal Grandfather

## 2024-10-21 ENCOUNTER — HOSPITAL ENCOUNTER (OUTPATIENT)
Age: 34
Discharge: HOME | End: 2024-10-21
Payer: COMMERCIAL

## 2024-10-21 DIAGNOSIS — R53.82 CHRONIC FATIGUE: ICD-10-CM

## 2024-10-21 LAB
25(OH)D3 SERPL-MCNC: 21 NG/ML (ref 30–100)
BASOPHILS # BLD: 0.03 K/UL (ref 0–0.2)
BASOPHILS NFR BLD: 0 % (ref 0–2)
EOSINOPHIL # BLD: 0.14 K/UL (ref 0–0.44)
EOSINOPHILS RELATIVE PERCENT: 1 % (ref 1–4)
ERYTHROCYTE [DISTWIDTH] IN BLOOD BY AUTOMATED COUNT: 13.2 % (ref 11.8–14.4)
HCT VFR BLD AUTO: 40.9 % (ref 36.3–47.1)
HGB BLD-MCNC: 14 G/DL (ref 11.9–15.1)
IMM GRANULOCYTES # BLD AUTO: 0.03 K/UL (ref 0–0.3)
IMM GRANULOCYTES NFR BLD: 0 %
LYMPHOCYTES NFR BLD: 2.91 K/UL (ref 1.1–3.7)
LYMPHOCYTES RELATIVE PERCENT: 30 % (ref 24–43)
MCH RBC QN AUTO: 29.7 PG (ref 25.2–33.5)
MCHC RBC AUTO-ENTMCNC: 34.2 G/DL (ref 28.4–34.8)
MCV RBC AUTO: 86.7 FL (ref 82.6–102.9)
MONOCYTES NFR BLD: 0.66 K/UL (ref 0.1–1.2)
MONOCYTES NFR BLD: 7 % (ref 3–12)
NEUTROPHILS NFR BLD: 62 % (ref 36–65)
NEUTS SEG NFR BLD: 6.11 K/UL (ref 1.5–8.1)
NRBC BLD-RTO: 0 PER 100 WBC
PLATELET # BLD AUTO: 282 K/UL (ref 138–453)
PMV BLD AUTO: 10.5 FL (ref 8.1–13.5)
RBC # BLD AUTO: 4.72 M/UL (ref 3.95–5.11)
T4 FREE SERPL-MCNC: 1.4 NG/DL (ref 0.92–1.68)
TSH SERPL DL<=0.05 MIU/L-ACNC: 1.29 UIU/ML (ref 0.27–4.2)
VIT B12 SERPL-MCNC: 1505 PG/ML (ref 232–1245)
WBC OTHER # BLD: 9.9 K/UL (ref 3.5–11.3)

## 2024-10-21 PROCEDURE — 82607 VITAMIN B-12: CPT

## 2024-10-21 PROCEDURE — 84443 ASSAY THYROID STIM HORMONE: CPT

## 2024-10-21 PROCEDURE — 84439 ASSAY OF FREE THYROXINE: CPT

## 2024-10-21 PROCEDURE — 85025 COMPLETE CBC W/AUTO DIFF WBC: CPT

## 2024-10-21 PROCEDURE — 82306 VITAMIN D 25 HYDROXY: CPT

## 2024-10-21 PROCEDURE — 36415 COLL VENOUS BLD VENIPUNCTURE: CPT

## 2024-10-22 ENCOUNTER — PATIENT MESSAGE (OUTPATIENT)
Dept: PRIMARY CARE CLINIC | Age: 34
End: 2024-10-22

## 2024-11-07 RX ORDER — SEMAGLUTIDE 2.4 MG/.75ML
2.4 INJECTION, SOLUTION SUBCUTANEOUS
Qty: 3 ML | Refills: 2 | Status: SHIPPED | OUTPATIENT
Start: 2024-11-07

## 2024-12-03 RX ORDER — SEMAGLUTIDE 2.4 MG/.75ML
2.4 INJECTION, SOLUTION SUBCUTANEOUS
Qty: 3 ML | Refills: 0 | Status: SHIPPED | OUTPATIENT
Start: 2024-12-03

## 2024-12-06 ENCOUNTER — PATIENT MESSAGE (OUTPATIENT)
Dept: PRIMARY CARE CLINIC | Age: 34
End: 2024-12-06

## 2024-12-06 DIAGNOSIS — E66.9 MODERATE OBESITY: Primary | ICD-10-CM

## 2024-12-12 ENCOUNTER — PATIENT MESSAGE (OUTPATIENT)
Dept: PRIMARY CARE CLINIC | Age: 34
End: 2024-12-12

## 2024-12-12 DIAGNOSIS — R10.84 GENERALIZED ABDOMINAL PAIN: ICD-10-CM

## 2024-12-12 DIAGNOSIS — K58.0 IRRITABLE BOWEL SYNDROME WITH DIARRHEA: Primary | ICD-10-CM

## 2024-12-23 RX ORDER — SEMAGLUTIDE 2.4 MG/.75ML
2.4 INJECTION, SOLUTION SUBCUTANEOUS
Qty: 3 ML | Refills: 0 | Status: SHIPPED | OUTPATIENT
Start: 2024-12-23

## 2025-01-07 NOTE — H&P
Subjective     Patient ID: Sam Gamino is a 77 y.o. male.    Chief Complaint: Follow-up (Hosp f/u, concerns with memory issues, while in the hospital thought the transporter was his son per wife )    Hospital Follow-up:    Admit date: 24  Discharge date: 25  Hospital D/c for: Influenza    Patient was called within 2 days of hospital discharge and made a follow-up appt with me within 7 calendar days of discharge.  The Discharge Summary was reviewed.       Family and/or Caretaker present at visit?  No  Diagnostic tests reviewed/disposition:  Yes.  Disease/illness education: Yes.   Home health/community services discussion/referrals:  He is active with home health care  Establishment or re-establishment of referral orders for community resources: None needed   Discussion with other health care providers: Notes in EPIC for review by all healthcare providers.            Review of Systems   Constitutional: Negative.    Respiratory: Negative.     Cardiovascular: Negative.    Neurological:  Positive for memory loss.   Psychiatric/Behavioral:  Positive for confusion.           Objective     Physical Exam  Vitals and nursing note reviewed.   Constitutional:       Appearance: He is well-developed.   HENT:      Head: Normocephalic and atraumatic.   Eyes:      Pupils: Pupils are equal, round, and reactive to light.   Neck:      Vascular: No JVD.   Cardiovascular:      Rate and Rhythm: Normal rate and regular rhythm.      Heart sounds: Murmur heard.      Systolic murmur is present with a grade of 2/6.      Comments: Holosystolic murmur greatest at the left lower sternal border.  Pulmonary:      Effort: Pulmonary effort is normal.   Abdominal:      General: Abdomen is protuberant. Bowel sounds are normal.      Palpations: Abdomen is soft.      Tenderness: There is no rebound.   Musculoskeletal:      Right lower le+ Edema present.      Left lower le+ Edema present.   Neurological:      Mental Status: He is  alert.            Assessment and Plan     1. Nonrheumatic mitral valve regurgitation  Overview:  2007 - Mild  4/22 - Mod/Severe      2. Primary hypertension  Overview:  2002: Diagnosed.         3. Heart failure, diastolic, chronic  Overview:  5/16/2022: Echo: Normal left ventricular size and systolic function. EF 55%. Moderate MR. Mild to moderate TR.   6/6/2023: Echo: Normal left ventricular size and systolic function. EF 60%. LVGLS -15%. Moderate diastolic dysfunction. Mild to moderate AR. Mild to moderate MR. Mildly enlarged ascending aorta. Small pericardial effusion.  10/4/2024: Echo: Normal left ventricular size and systolic function. EF 60%. Moderate diastolic dysfunction. Mild aortic valve sclerosis. Moderate AR. Moderate MR. Mild to moderate TR. SPAP 53. Small pericardial effusion.          4. Hypercholesterolemia  Overview:  2002: Statin was begun.      5. Influenza A    6. Non-Hodgkin's lymphoma, unspecified body region, unspecified non-Hodgkin lymphoma type    7. Status post bone marrow transplant        PLAN:  Per orders and D/C instructions.  Continue diet and/or meds for HTN, and high cholesterol, which are stable.  Follow-up with cardiology for mitral valve regurgitation and diastolic heart failure.   He has a history of lymphoma status post bone marrow transplant with no evidence of recurrence.  He had some delirium in hospital which is clearing.  Continue Aricept.    Follow up in about 3 months (around 4/7/2025).     escape proof reading.  Actual meaning can be extrapolated by contextual diversion.     Pratima Virk MD, FACG  Board Certified in Gastroenterology and Internal Medicine  [facility]  Office #: [phone]

## 2025-01-16 ENCOUNTER — PATIENT MESSAGE (OUTPATIENT)
Dept: PRIMARY CARE CLINIC | Age: 35
End: 2025-01-16

## 2025-01-21 DIAGNOSIS — K58.0 IRRITABLE BOWEL SYNDROME WITH DIARRHEA: Primary | ICD-10-CM

## 2025-01-22 NOTE — PROGRESS NOTES
[facility]  [facility] PRIMARY CARE  [address] [address]  Dept: [phone]    Priti Daly is a 34 y.o. female who presents today for her medical conditions/complaints as noted below.      Chief Complaint   Patient presents with    Weight Management     Patient is here for for weight management - patient was taking Wegovy 2.4 mg but she took her last injection 1/12/25 - patient states she is stopping medication due to not losing weight   Patient is going to circuit 5-6 days a week   Patient is waiting on new mouth guard     Fatigue     Patient is still  having fatigue        HPI:     HPI  Taking Wegovy 2.4mg and still ahs not lost any weight   Gained weight this time  Down 28 pounds since this time last year.    Exercise: 5-6 days a week.    Eating a healthy diet  Tried Nourish and she got sick on the low FODMAPs diet.     Normal sleep study in 2023    Does not think the Pristiq is helping and the high dose gave her SEs  She is willing to try counseling--offered [facility]--she will find one.Stress has a lot to do with her partners' children.            No components found for: \"LDLCHOLESTEROL\", \"LDLCALC\"    (goal LDL is <100)   AST (U/L)   Date Value   07/19/2024 31     ALT (U/L)   Date Value   07/19/2024 59 (H)     BUN (mg/dL)   Date Value   07/19/2024 8     BP Readings from Last 3 Encounters:   01/23/25 110/80   10/17/24 118/80   07/23/24 (!) 128/90          (goal 120/80)    Past Medical History:   Diagnosis Date    Factor V Leiden mutation (HCC)     H/O concussion     H/O contact dermatitis     H/O irritable bowel syndrome     Migraines     Pain of anterior chest wall with respiration       Past Surgical History:   Procedure Laterality Date    CHOLECYSTECTOMY      COLONOSCOPY      PARTIAL HYMENECTOMY  01/01/2008    SKIN BIOPSY      UPPER GASTROINTESTINAL ENDOSCOPY  2004    UPPER GASTROINTESTINAL ENDOSCOPY N/A 2/14/2024    EGD BIOPSY performed by Pratima Virk,

## 2025-01-23 ENCOUNTER — OFFICE VISIT (OUTPATIENT)
Dept: PRIMARY CARE CLINIC | Age: 35
End: 2025-01-23
Payer: COMMERCIAL

## 2025-01-23 ENCOUNTER — PATIENT MESSAGE (OUTPATIENT)
Dept: PRIMARY CARE CLINIC | Age: 35
End: 2025-01-23

## 2025-01-23 VITALS
BODY MASS INDEX: 37.51 KG/M2 | DIASTOLIC BLOOD PRESSURE: 80 MMHG | SYSTOLIC BLOOD PRESSURE: 110 MMHG | OXYGEN SATURATION: 99 % | HEIGHT: 67 IN | WEIGHT: 239 LBS | HEART RATE: 70 BPM

## 2025-01-23 DIAGNOSIS — F41.1 GENERALIZED ANXIETY DISORDER: ICD-10-CM

## 2025-01-23 DIAGNOSIS — L20.84 INTRINSIC ATOPIC DERMATITIS: ICD-10-CM

## 2025-01-23 DIAGNOSIS — E55.9 VITAMIN D DEFICIENCY: ICD-10-CM

## 2025-01-23 DIAGNOSIS — E66.9 MODERATE OBESITY: ICD-10-CM

## 2025-01-23 DIAGNOSIS — Z79.899 MEDICATION MANAGEMENT: Primary | ICD-10-CM

## 2025-01-23 PROCEDURE — 99214 OFFICE O/P EST MOD 30 MIN: CPT | Performed by: PHYSICIAN ASSISTANT

## 2025-01-23 RX ORDER — SACCHAROMYCES BOULARDII 250 MG
CAPSULE ORAL
COMMUNITY

## 2025-01-23 RX ORDER — TRIAMCINOLONE ACETONIDE 5 MG/G
CREAM TOPICAL
Qty: 30 G | Refills: 0 | Status: SHIPPED | OUTPATIENT
Start: 2025-01-23

## 2025-01-23 RX ORDER — BUSPIRONE HYDROCHLORIDE 5 MG/1
5 TABLET ORAL 3 TIMES DAILY PRN
Qty: 45 TABLET | Refills: 0 | Status: SHIPPED | OUTPATIENT
Start: 2025-01-23 | End: 2025-02-22

## 2025-01-23 SDOH — ECONOMIC STABILITY: FOOD INSECURITY: WITHIN THE PAST 12 MONTHS, YOU WORRIED THAT YOUR FOOD WOULD RUN OUT BEFORE YOU GOT MONEY TO BUY MORE.: NEVER TRUE

## 2025-01-23 SDOH — ECONOMIC STABILITY: FOOD INSECURITY: WITHIN THE PAST 12 MONTHS, THE FOOD YOU BOUGHT JUST DIDN'T LAST AND YOU DIDN'T HAVE MONEY TO GET MORE.: NEVER TRUE

## 2025-01-23 ASSESSMENT — PATIENT HEALTH QUESTIONNAIRE - PHQ9
SUM OF ALL RESPONSES TO PHQ QUESTIONS 1-9: 4
4. FEELING TIRED OR HAVING LITTLE ENERGY: SEVERAL DAYS
2. FEELING DOWN, DEPRESSED OR HOPELESS: SEVERAL DAYS
3. TROUBLE FALLING OR STAYING ASLEEP: NOT AT ALL
8. MOVING OR SPEAKING SO SLOWLY THAT OTHER PEOPLE COULD HAVE NOTICED. OR THE OPPOSITE, BEING SO FIGETY OR RESTLESS THAT YOU HAVE BEEN MOVING AROUND A LOT MORE THAN USUAL: NOT AT ALL
10. IF YOU CHECKED OFF ANY PROBLEMS, HOW DIFFICULT HAVE THESE PROBLEMS MADE IT FOR YOU TO DO YOUR WORK, TAKE CARE OF THINGS AT HOME, OR GET ALONG WITH OTHER PEOPLE: NOT DIFFICULT AT ALL
SUM OF ALL RESPONSES TO PHQ QUESTIONS 1-9: 4
6. FEELING BAD ABOUT YOURSELF - OR THAT YOU ARE A FAILURE OR HAVE LET YOURSELF OR YOUR FAMILY DOWN: SEVERAL DAYS
9. THOUGHTS THAT YOU WOULD BE BETTER OFF DEAD, OR OF HURTING YOURSELF: NOT AT ALL
7. TROUBLE CONCENTRATING ON THINGS, SUCH AS READING THE NEWSPAPER OR WATCHING TELEVISION: NOT AT ALL
SUM OF ALL RESPONSES TO PHQ9 QUESTIONS 1 & 2: 2
SUM OF ALL RESPONSES TO PHQ QUESTIONS 1-9: 4
1. LITTLE INTEREST OR PLEASURE IN DOING THINGS: SEVERAL DAYS
SUM OF ALL RESPONSES TO PHQ QUESTIONS 1-9: 4
5. POOR APPETITE OR OVEREATING: NOT AT ALL

## 2025-01-23 ASSESSMENT — ENCOUNTER SYMPTOMS
GASTROINTESTINAL NEGATIVE: 1
RESPIRATORY NEGATIVE: 1

## 2025-01-27 ENCOUNTER — PATIENT MESSAGE (OUTPATIENT)
Dept: PRIMARY CARE CLINIC | Age: 35
End: 2025-01-27

## 2025-01-27 NOTE — TELEPHONE ENCOUNTER
From: Priti Daly  To: Mary Kate Sykes  Sent: 3/28/2024 10:14 AM EDT  Subject: semaglutide    I spoke with my mother and she stated [facility] in [place], [place] has the semaglutide and they dont go through my insurance. could you please send that medication into that pharmacy so i could start taking it?    [facility] for future refills.

## 2025-01-30 ENCOUNTER — OFFICE VISIT (OUTPATIENT)
Dept: PRIMARY CARE CLINIC | Age: 35
End: 2025-01-30

## 2025-01-30 VITALS
OXYGEN SATURATION: 99 % | DIASTOLIC BLOOD PRESSURE: 80 MMHG | HEIGHT: 67 IN | HEART RATE: 71 BPM | WEIGHT: 238 LBS | BODY MASS INDEX: 37.35 KG/M2 | SYSTOLIC BLOOD PRESSURE: 108 MMHG

## 2025-01-30 DIAGNOSIS — F32.A ANXIETY AND DEPRESSION: Primary | ICD-10-CM

## 2025-01-30 DIAGNOSIS — T75.3XXA MOTION SICKNESS, INITIAL ENCOUNTER: ICD-10-CM

## 2025-01-30 DIAGNOSIS — F41.9 ANXIETY AND DEPRESSION: Primary | ICD-10-CM

## 2025-01-30 RX ORDER — SCOPOLAMINE 1 MG/3D
1 PATCH, EXTENDED RELEASE TRANSDERMAL
Qty: 10 PATCH | Refills: 0 | Status: SHIPPED | OUTPATIENT
Start: 2025-01-30

## 2025-01-30 RX ORDER — DESVENLAFAXINE 25 MG/1
25 TABLET, EXTENDED RELEASE ORAL DAILY
Qty: 90 TABLET | Refills: 1 | Status: SHIPPED | OUTPATIENT
Start: 2025-01-30

## 2025-01-30 ASSESSMENT — ENCOUNTER SYMPTOMS
COUGH: 0
CHEST TIGHTNESS: 0
CONSTIPATION: 0
ABDOMINAL PAIN: 0
ABDOMINAL DISTENTION: 0
SORE THROAT: 0
DIARRHEA: 0
SHORTNESS OF BREATH: 0

## 2025-01-30 NOTE — PROGRESS NOTES
Objective:     /80   Pulse 71   Ht 1.702 m (5' 7\")   Wt 108 kg (238 lb)   LMP 01/23/2025   SpO2 99%   BMI 37.28 kg/m²   Physical Exam  Constitutional:       General: She is not in acute distress.     Appearance: Normal appearance. She is not ill-appearing.   HENT:      Head: Normocephalic and atraumatic.      Right Ear: External ear normal.      Left Ear: External ear normal.      Nose: Nose normal.      Mouth/Throat:      Mouth: Mucous membranes are moist.   Neck:      Vascular: No carotid bruit.   Cardiovascular:      Rate and Rhythm: Normal rate and regular rhythm.      Pulses: Normal pulses.      Heart sounds: Normal heart sounds.   Pulmonary:      Effort: Pulmonary effort is normal. No respiratory distress.      Breath sounds: Normal breath sounds.   Musculoskeletal:         General: Normal range of motion.      Cervical back: Normal range of motion and neck supple.   Lymphadenopathy:      Cervical: No cervical adenopathy.   Skin:     General: Skin is warm and dry.   Neurological:      Mental Status: She is alert and oriented to person, place, and time.   Psychiatric:         Mood and Affect: Mood normal.         Behavior: Behavior normal.         Thought Content: Thought content normal.           Assessment and Plan:     Assessment & Plan  1. Pristiq withdrawal.  The symptoms of tingling, wooziness, and dizziness are likely due to withdrawal from Pristiq.  A prescription for Pristiq 25 mg, a 90-day supply, has been sent to [facility] in [place]. She is advised to resume Pristiq at this dosage and monitor her response. If stress levels increase, she can take BuSpar regularly as needed. She is also encouraged to practice box breathing (4 seconds in, 4 seconds hold, 4 seconds out, 4 seconds hold) during episodes of tingling to see if it helps. If symptoms persist, further evaluation including checking sugars, B12, and calcium levels may be considered.    2. Motion sickness.  A prescription for

## 2025-03-02 DIAGNOSIS — G47.00 INSOMNIA, UNSPECIFIED TYPE: ICD-10-CM

## 2025-03-03 RX ORDER — TRAZODONE HYDROCHLORIDE 50 MG/1
50 TABLET ORAL NIGHTLY
Qty: 90 TABLET | Refills: 3 | Status: SHIPPED | OUTPATIENT
Start: 2025-03-03

## 2025-03-04 ENCOUNTER — PATIENT MESSAGE (OUTPATIENT)
Dept: PRIMARY CARE CLINIC | Age: 35
End: 2025-03-04

## 2025-03-04 ENCOUNTER — HOSPITAL ENCOUNTER (EMERGENCY)
Facility: CLINIC | Age: 35
Discharge: HOME | End: 2025-03-04
Attending: STUDENT IN AN ORGANIZED HEALTH CARE EDUCATION/TRAINING PROGRAM
Payer: COMMERCIAL

## 2025-03-04 VITALS
WEIGHT: 235 LBS | SYSTOLIC BLOOD PRESSURE: 153 MMHG | OXYGEN SATURATION: 97 % | BODY MASS INDEX: 36.88 KG/M2 | RESPIRATION RATE: 18 BRPM | TEMPERATURE: 98.2 F | HEIGHT: 67 IN | HEART RATE: 82 BPM | DIASTOLIC BLOOD PRESSURE: 107 MMHG

## 2025-03-04 DIAGNOSIS — R04.0 EPISTAXIS: Primary | ICD-10-CM

## 2025-03-04 PROCEDURE — 99282 EMERGENCY DEPT VISIT SF MDM: CPT

## 2025-03-04 RX ORDER — PHENTERMINE AND TOPIRAMATE 7.5; 46 MG/1; MG/1
CAPSULE, EXTENDED RELEASE ORAL
COMMUNITY
Start: 2025-03-02

## 2025-03-04 ASSESSMENT — PAIN - FUNCTIONAL ASSESSMENT: PAIN_FUNCTIONAL_ASSESSMENT: 0-10

## 2025-03-04 ASSESSMENT — PAIN SCALES - GENERAL: PAINLEVEL_OUTOF10: 0

## 2025-03-05 NOTE — DISCHARGE INSTRUCTIONS
SUMMARY OF YOUR VISIT    Today you were seen for nosebleeds.  I sent you home with a bottle of Afrin.  I recommend you use this as needed for nosebleeds.  We discussed placing Vaseline daily into the nose to help moisten the nasal passages to prevent nosebleeds.  I do recommend you follow-up with an ear nose and throat doctor.  I recommend you follow-up with your primary care provider.  If you have any new, changing, worsening, no improvement or develop additional symptoms or concerns I recommend return to the emergency department for reevaluation.    Please continue to take your home medication as previously prescribed, I have made no changes to your home medications.        You can return to our or another Emergency Department as needed or for worsening symptoms of chest pain, shortness of breath, high fevers not relieved by acetaminophen (Tylenol) and/or ibuprofen (Motrin / Advil), chills, feeling of your heart fluttering or racing, persistent nausea and/or vomiting, vomiting up blood, blood in your stool, loss of consciousness, numbness, weakness or tingling in the arms or legs or change in color of the extremities, changes in mental status, persistent headache, blurry vision, loss of bladder / bowel control, if you are unable to follow up with your physician, or other any other care or concern.    Thank You!    On behalf of the Emergency Department staff and team, I would like to thank you for allowing us the opportunity to participate in your health care and evaluation today.

## 2025-03-05 NOTE — ED TRIAGE NOTES
Pt to ED for epistaxis, reports that nose started bleeding about 35 mins PTA. Stopped in triage waiting room. PMH of Factor 5 Leiden. Not taking blood thinners. Endorses bright red blood with clots. 1 nosebleed earlier that lasted 20 mins. No nasal trauma. Endorses using saline and a humidifier at home.

## 2025-03-05 NOTE — ED PROVIDER NOTES
[facility] EMERGENCY DEPARTMENT  Emergency Department Encounter  [facility]       Pt Name:Priti Daly  MRN: [id number]  Birthdate 1990  Date of evaluation: 3/4/25  PCP:  Mary Kate Sykes PA-C      CHIEF COMPLAINT       Chief Complaint   Patient presents with    Epistaxis       HISTORY OF PRESENT ILLNESS     Priti Daly is a 34 y.o. female who presents via personal vehicle with reports of nosebleeds.  Patient has had 3 nosebleeds today.  She does have factor V Leyden.  She is not previously had issues with nosebleeds in the past or currently.  She has previously had nosebleeds although has not had recurrent nosebleeds.  She has never previously had to see an ear nose and throat doctor.  She is currently not having any bleeding as her bleeding stopped in the waiting room.  No known injury.  She is using a humidifier next to her bed.  She is using nasal saline in the mornings.  No other acute complaints at this time.    PAST MEDICAL / SURGICAL / SOCIAL / FAMILY HISTORY      has a past medical history of Factor V Leiden mutation, H/O concussion, H/O contact dermatitis, H/O irritable bowel syndrome, Migraines, and Pain of anterior chest wall with respiration.       has a past surgical history that includes partial hymenectomy (01/01/2008); Cholecystectomy; Upper gastrointestinal endoscopy (2004); [place] tooth extraction (Bilateral); Colonoscopy; skin biopsy; and Upper gastrointestinal endoscopy (N/A, 2/14/2024).      Social History     Socioeconomic History    Marital status: Single     Spouse name: Not on file    Number of children: Not on file    Years of education: Not on file    Highest education level: Not on file   Occupational History    Not on file   Tobacco Use    Smoking status: Never    Smokeless tobacco: Never   Vaping Use    Vaping status: Former   Substance and Sexual Activity    Alcohol use: Yes     Comment: Socially    Drug use: No    Sexual activity:  a procedure that is not indicated as she is having no active bleeding at this time.  Patient continues to be tearful.  She states that she does not have elevated blood pressure, at this time I offered for patient to stay in our emergency department to obtain a cardiac workup due to elevated blood pressure and headache although as I am actively discussing this with patient she walks out of the room and leaves the emergency department.                     The patient and/or family and I have discussed the diagnosis(es) and I offered further work up as patient was unhappy with work up and disposition plan although patient walked out of the room as I was actively discussing changes to current plan, and therefore appears to decline further workup. The patient appears stable for discharge and has been instructed to return immediately for new concerning symptoms or if the symptoms worsen in any way. Routine discharge counseling was given, and the patient understands that worsening, changing or persistent symptoms should prompt an immediate call or follow up with their primary physician or return to the emergency department.     I have reviewed the disposition diagnosis with the patient and or their family/guardian. I have answered their questions and given discharge instructions. They voiced understanding of these instructions and did not have any further questions or complaints, walked out of room during my active discussion, steady gait, neurologically intact with no active epistaxis.    FINAL IMPRESSION      1. Epistaxis          DISPOSITION / PLAN     DISPOSITION Decision To Discharge 03/04/2025 08:55:43 PM   DISPOSITION CONDITION Stable           PATIENT REFERRED TO:  Mary Kate Sykes PA-C  [address] [address] [phone]    Call   For Post Emergency Department Follow Up    [facility]  [address] [address]  [phone]    As needed, If symptoms

## 2025-04-07 ENCOUNTER — PATIENT MESSAGE (OUTPATIENT)
Dept: PRIMARY CARE CLINIC | Age: 35
End: 2025-04-07

## 2025-04-12 ENCOUNTER — PATIENT MESSAGE (OUTPATIENT)
Dept: PRIMARY CARE CLINIC | Age: 35
End: 2025-04-12

## 2025-04-12 DIAGNOSIS — E66.9 MODERATE OBESITY: ICD-10-CM

## 2025-04-12 DIAGNOSIS — E66.812 CLASS 2 OBESITY WITHOUT SERIOUS COMORBIDITY WITH BODY MASS INDEX (BMI) OF 39.0 TO 39.9 IN ADULT, UNSPECIFIED OBESITY TYPE: ICD-10-CM

## 2025-04-18 ENCOUNTER — OFFICE VISIT (OUTPATIENT)
Dept: PRIMARY CARE CLINIC | Age: 35
End: 2025-04-18
Payer: COMMERCIAL

## 2025-04-18 VITALS
WEIGHT: 249.8 LBS | DIASTOLIC BLOOD PRESSURE: 82 MMHG | OXYGEN SATURATION: 97 % | BODY MASS INDEX: 39.21 KG/M2 | SYSTOLIC BLOOD PRESSURE: 122 MMHG | HEIGHT: 67 IN | HEART RATE: 86 BPM

## 2025-04-18 DIAGNOSIS — M77.8 TENDONITIS OF ELBOW, RIGHT: Primary | ICD-10-CM

## 2025-04-18 DIAGNOSIS — R19.7 DIARRHEA, UNSPECIFIED TYPE: ICD-10-CM

## 2025-04-18 DIAGNOSIS — R79.89 ELEVATED LFTS: ICD-10-CM

## 2025-04-18 DIAGNOSIS — R19.5 ELEVATED FECAL CALPROTECTIN: ICD-10-CM

## 2025-04-18 LAB
ALBUMIN/GLOBULIN RATIO: 1.5 (ref 1–2.5)
ALBUMIN: 4.4 G/DL (ref 3.5–5.2)
ALP BLD-CCNC: 96 U/L (ref 35–104)
ALT SERPL-CCNC: 25 U/L (ref 10–35)
AST SERPL-CCNC: 24 U/L (ref 10–35)
BASOPHILS ABSOLUTE: 0.04 K/UL (ref 0–0.2)
BASOPHILS RELATIVE PERCENT: 1 % (ref 0–2)
BILIRUB SERPL-MCNC: 0.2 MG/DL (ref 0–1.2)
BILIRUBIN DIRECT: 0.1 MG/DL (ref 0–0.2)
BILIRUBIN, INDIRECT: 0.1 MG/DL (ref 0–1)
EOSINOPHILS ABSOLUTE: 0.18 K/UL (ref 0–0.44)
EOSINOPHILS RELATIVE PERCENT: 2 % (ref 1–4)
GLOBULIN: 3 G/DL
HCT VFR BLD CALC: 42.2 % (ref 36.3–47.1)
HEMOGLOBIN: 13.8 G/DL (ref 11.9–15.1)
IMMATURE GRANULOCYTES %: 0 %
IMMATURE GRANULOCYTES ABSOLUTE: <0.03 K/UL (ref 0–0.3)
LYMPHOCYTES ABSOLUTE: 2.56 K/UL (ref 1.1–3.7)
LYMPHOCYTES RELATIVE PERCENT: 30 % (ref 24–43)
MCH RBC QN AUTO: 29.9 PG (ref 25.2–33.5)
MCHC RBC AUTO-ENTMCNC: 32.7 G/DL (ref 28.4–34.8)
MCV RBC AUTO: 91.5 FL (ref 82.6–102.9)
MONOCYTES ABSOLUTE: 0.69 K/UL (ref 0.1–1.2)
MONOCYTES RELATIVE PERCENT: 8 % (ref 3–12)
NEUTROPHILS ABSOLUTE: 5.01 K/UL (ref 1.5–8.1)
NEUTROPHILS RELATIVE PERCENT: 59 % (ref 36–65)
NRBC AUTOMATED: 0 PER 100 WBC
PDW BLD-RTO: 12.2 % (ref 11.8–14.4)
PLATELET # BLD: 295 K/UL (ref 138–453)
PMV BLD AUTO: 10.2 FL (ref 8.1–13.5)
RBC # BLD: 4.61 M/UL (ref 3.95–5.11)
TOTAL PROTEIN: 7.4 G/DL (ref 6.6–8.7)
WBC # BLD: 8.5 K/UL (ref 3.5–11.3)

## 2025-04-18 PROCEDURE — 99214 OFFICE O/P EST MOD 30 MIN: CPT | Performed by: PHYSICIAN ASSISTANT

## 2025-04-18 RX ORDER — BUSPIRONE HYDROCHLORIDE 5 MG/1
TABLET ORAL
COMMUNITY
Start: 2025-01-23

## 2025-04-18 RX ORDER — NAPROXEN 375 MG/1
375 TABLET ORAL 2 TIMES DAILY PRN
Qty: 60 TABLET | Refills: 0 | Status: SHIPPED | OUTPATIENT
Start: 2025-04-18

## 2025-04-18 RX ORDER — CHOLESTYRAMINE 4 G/9G
1 POWDER, FOR SUSPENSION ORAL 2 TIMES DAILY
Qty: 90 PACKET | Refills: 0 | Status: SHIPPED | OUTPATIENT
Start: 2025-04-18

## 2025-04-18 ASSESSMENT — ENCOUNTER SYMPTOMS
DIARRHEA: 1
BLOOD IN STOOL: 0
NAUSEA: 0
VOMITING: 0
ABDOMINAL PAIN: 0

## 2025-04-18 NOTE — PROGRESS NOTES
[facility]  [address]  [place] [address]  Phone: [phone]  Fax: [phone]    Priti Daly is a 35 y.o. female who presents today for her medical conditions/complaintsas noted below.  Chief Complaint   Patient presents with    Arm Pain     Patient is here due to right arm pain - patient states started x2 weeks ago - unsure the cause     Seen GI x2 weeks ago - patient is concerned of high lab results     Has not started Qsymia yet     Pap scheduled     GI Problem        HPI:     HPI  Right arm pain began 2 or more weeks ago.  Really bothers her at work-out. It is in her forearm   NO Remembered injury  NO N/T   No loss of .     She is very frustrated with the continued diarrhea w/o blood. Happens several times a day and does not seem related to any particular food or drink. Sounds to me like GI at [facility] thinks IBS at this time.  Fecal calprotectin is high--this is second high one. She is currently trying probiotic, following FODMAPS, and using Immodium.     She states she has a small ball like lump she feels at superior end of her vulva. No discharge, redness, tenderness, lesion per her partner.  She has an appt with GYN in May.          Current Outpatient Medications   Medication Sig Dispense Refill    Digestive Enzymes (ENZYME DIGEST PO) Take by mouth      Probiotic Product (PROBIOTIC ADVANCED PO) Take by mouth      busPIRone (BUSPAR) 5 MG tablet busPIRone (BUSPAR) 5 mg tablet as needed. 01/23/2025 Active      naproxen (NAPROSYN) 375 MG tablet Take 1 tablet by mouth 2 times daily as needed for Pain 60 tablet 0    cholestyramine (QUESTRAN) 4 g packet Take 1 packet by mouth 2 times daily 90 packet 0    Phentermine-Topiramate 3.75-23 MG CP24 Take 1 tablet by mouth every morning for 14 days. Max Daily Amount: 1 tablet 14 capsule 0    Phentermine-Topiramate 7.5-46 MG CP24 Take 1 tablet by mouth every morning for 90 days. 90 capsule 0    traZODone

## 2025-04-21 ENCOUNTER — RESULTS FOLLOW-UP (OUTPATIENT)
Dept: PRIMARY CARE CLINIC | Age: 35
End: 2025-04-21

## 2025-05-01 ENCOUNTER — PATIENT MESSAGE (OUTPATIENT)
Dept: PRIMARY CARE CLINIC | Age: 35
End: 2025-05-01

## 2025-05-01 DIAGNOSIS — Z79.899 MEDICATION MANAGEMENT: Primary | ICD-10-CM

## 2025-05-01 DIAGNOSIS — E66.9 MODERATE OBESITY: ICD-10-CM

## 2025-05-01 RX ORDER — PHENTERMINE AND TOPIRAMATE 11.25; 69 MG/1; MG/1
1 CAPSULE, EXTENDED RELEASE ORAL DAILY
Qty: 30 CAPSULE | Refills: 0 | Status: SHIPPED | OUTPATIENT
Start: 2025-05-01 | End: 2025-05-31

## 2025-06-10 ENCOUNTER — PATIENT MESSAGE (OUTPATIENT)
Dept: PRIMARY CARE CLINIC | Age: 35
End: 2025-06-10

## 2025-06-10 DIAGNOSIS — F41.9 ANXIETY AND DEPRESSION: ICD-10-CM

## 2025-06-10 DIAGNOSIS — F32.A ANXIETY AND DEPRESSION: ICD-10-CM

## 2025-06-10 RX ORDER — DESVENLAFAXINE 50 MG/1
50 TABLET, FILM COATED, EXTENDED RELEASE ORAL DAILY
Qty: 30 TABLET | Refills: 1 | Status: SHIPPED | OUTPATIENT
Start: 2025-06-10

## 2025-07-28 ENCOUNTER — PATIENT MESSAGE (OUTPATIENT)
Dept: PRIMARY CARE CLINIC | Age: 35
End: 2025-07-28

## 2025-07-28 DIAGNOSIS — S39.92XS INJURY OF BACK, SEQUELA: ICD-10-CM

## 2025-07-28 DIAGNOSIS — M54.9 BACK PAIN, UNSPECIFIED BACK LOCATION, UNSPECIFIED BACK PAIN LATERALITY, UNSPECIFIED CHRONICITY: Primary | ICD-10-CM

## 2025-08-03 DIAGNOSIS — F41.9 ANXIETY AND DEPRESSION: ICD-10-CM

## 2025-08-03 DIAGNOSIS — F32.A ANXIETY AND DEPRESSION: ICD-10-CM

## 2025-08-04 RX ORDER — DESVENLAFAXINE 50 MG/1
50 TABLET, FILM COATED, EXTENDED RELEASE ORAL DAILY
Qty: 30 TABLET | Refills: 2 | Status: SHIPPED | OUTPATIENT
Start: 2025-08-04

## 2025-08-06 ENCOUNTER — HOSPITAL ENCOUNTER (OUTPATIENT)
Dept: PHYSICAL THERAPY | Facility: CLINIC | Age: 35
Setting detail: THERAPIES SERIES
Discharge: HOME | End: 2025-08-06
Payer: COMMERCIAL

## 2025-08-06 PROCEDURE — 97161 PT EVAL LOW COMPLEX 20 MIN: CPT

## 2025-08-06 PROCEDURE — 97110 THERAPEUTIC EXERCISES: CPT

## 2025-08-11 ENCOUNTER — E-VISIT (OUTPATIENT)
Dept: PRIMARY CARE CLINIC | Age: 35
End: 2025-08-11
Payer: COMMERCIAL

## 2025-08-11 DIAGNOSIS — L24.A9 IRRITANT CONTACT DERMATITIS DUE TO OTHER BODY FLUID: Primary | ICD-10-CM

## 2025-08-11 DIAGNOSIS — B35.4 TINEA CORPORIS: ICD-10-CM

## 2025-08-11 PROCEDURE — 99422 OL DIG E/M SVC 11-20 MIN: CPT | Performed by: PHYSICIAN ASSISTANT

## 2025-08-12 RX ORDER — KETOCONAZOLE 20 MG/G
CREAM TOPICAL
Qty: 45 G | Refills: 0 | Status: SHIPPED | OUTPATIENT
Start: 2025-08-12

## 2025-08-13 ENCOUNTER — HOSPITAL ENCOUNTER (OUTPATIENT)
Dept: PHYSICAL THERAPY | Facility: CLINIC | Age: 35
Setting detail: THERAPIES SERIES
Discharge: HOME | End: 2025-08-13
Payer: COMMERCIAL

## 2025-08-13 PROCEDURE — 97110 THERAPEUTIC EXERCISES: CPT

## 2025-08-13 PROCEDURE — 97140 MANUAL THERAPY 1/> REGIONS: CPT

## 2025-08-15 ENCOUNTER — HOSPITAL ENCOUNTER (OUTPATIENT)
Dept: PHYSICAL THERAPY | Facility: CLINIC | Age: 35
Setting detail: THERAPIES SERIES
Discharge: HOME | End: 2025-08-15
Payer: COMMERCIAL

## 2025-08-18 ENCOUNTER — HOSPITAL ENCOUNTER (OUTPATIENT)
Dept: PHYSICAL THERAPY | Facility: CLINIC | Age: 35
Setting detail: THERAPIES SERIES
Discharge: HOME | End: 2025-08-18
Payer: COMMERCIAL

## 2025-08-18 PROCEDURE — 97110 THERAPEUTIC EXERCISES: CPT

## 2025-08-18 PROCEDURE — 97140 MANUAL THERAPY 1/> REGIONS: CPT

## 2025-08-20 ENCOUNTER — HOSPITAL ENCOUNTER (OUTPATIENT)
Dept: PHYSICAL THERAPY | Facility: CLINIC | Age: 35
Setting detail: THERAPIES SERIES
Discharge: HOME | End: 2025-08-20
Payer: COMMERCIAL

## 2025-08-20 PROCEDURE — 97110 THERAPEUTIC EXERCISES: CPT

## 2025-08-25 ENCOUNTER — HOSPITAL ENCOUNTER (OUTPATIENT)
Dept: PHYSICAL THERAPY | Facility: CLINIC | Age: 35
Setting detail: THERAPIES SERIES
Discharge: HOME | End: 2025-08-25
Payer: COMMERCIAL

## 2025-08-27 ENCOUNTER — HOSPITAL ENCOUNTER (OUTPATIENT)
Dept: PHYSICAL THERAPY | Facility: CLINIC | Age: 35
Setting detail: THERAPIES SERIES
Discharge: HOME | End: 2025-08-27
Payer: COMMERCIAL

## 2025-08-29 ENCOUNTER — PATIENT MESSAGE (OUTPATIENT)
Dept: PRIMARY CARE CLINIC | Age: 35
End: 2025-08-29

## (undated) DEVICE — BITEBLOCK ENDOSCP 60FR MAXI WHT POLYETH STURDY W/ VELC WVN

## (undated) DEVICE — FORCEPS BX L240CM JAW DIA2.4MM ORNG L CAP W/ NDL DISP RAD

## (undated) DEVICE — STAZ ENDO KIT: Brand: MEDLINE INDUSTRIES, INC.

## (undated) DEVICE — GLOVE SURG 8 11.7IN BEAD CUF LIGHT BRN SENSICARE LTX FREE